# Patient Record
Sex: MALE | Race: OTHER | NOT HISPANIC OR LATINO | URBAN - METROPOLITAN AREA
[De-identification: names, ages, dates, MRNs, and addresses within clinical notes are randomized per-mention and may not be internally consistent; named-entity substitution may affect disease eponyms.]

---

## 2018-03-16 ENCOUNTER — EMERGENCY (EMERGENCY)
Facility: HOSPITAL | Age: 83
LOS: 1 days | Discharge: HOME | End: 2018-03-16
Attending: EMERGENCY MEDICINE | Admitting: EMERGENCY MEDICINE
Payer: MEDICARE

## 2018-03-16 VITALS
HEART RATE: 78 BPM | OXYGEN SATURATION: 96 % | RESPIRATION RATE: 20 BRPM | SYSTOLIC BLOOD PRESSURE: 131 MMHG | TEMPERATURE: 99 F | DIASTOLIC BLOOD PRESSURE: 60 MMHG

## 2018-03-16 VITALS
HEART RATE: 107 BPM | DIASTOLIC BLOOD PRESSURE: 67 MMHG | TEMPERATURE: 98 F | RESPIRATION RATE: 20 BRPM | OXYGEN SATURATION: 98 % | SYSTOLIC BLOOD PRESSURE: 137 MMHG

## 2018-03-16 LAB
ALBUMIN SERPL ELPH-MCNC: 3.6 G/DL — SIGNIFICANT CHANGE UP (ref 3.3–5)
ALP SERPL-CCNC: 80 U/L — SIGNIFICANT CHANGE UP (ref 40–120)
ALT FLD-CCNC: 32 U/L — SIGNIFICANT CHANGE UP (ref 4–41)
ANISOCYTOSIS BLD QL: SLIGHT — SIGNIFICANT CHANGE UP
APTT BLD: 30.8 SEC — SIGNIFICANT CHANGE UP (ref 27.5–37.4)
AST SERPL-CCNC: 39 U/L — SIGNIFICANT CHANGE UP (ref 4–40)
BASE EXCESS BLDV CALC-SCNC: 3.3 MMOL/L — SIGNIFICANT CHANGE UP
BASOPHILS # BLD AUTO: 0.03 K/UL — SIGNIFICANT CHANGE UP (ref 0–0.2)
BASOPHILS NFR BLD AUTO: 0.4 % — SIGNIFICANT CHANGE UP (ref 0–2)
BILIRUB SERPL-MCNC: 0.4 MG/DL — SIGNIFICANT CHANGE UP (ref 0.2–1.2)
BLOOD GAS VENOUS - CREATININE: 1.51 MG/DL — HIGH (ref 0.5–1.3)
BUN SERPL-MCNC: 27 MG/DL — HIGH (ref 7–23)
CALCIUM SERPL-MCNC: 9.1 MG/DL — SIGNIFICANT CHANGE UP (ref 8.4–10.5)
CHLORIDE BLDV-SCNC: 106 MMOL/L — SIGNIFICANT CHANGE UP (ref 96–108)
CHLORIDE SERPL-SCNC: 101 MMOL/L — SIGNIFICANT CHANGE UP (ref 98–107)
CO2 SERPL-SCNC: 24 MMOL/L — SIGNIFICANT CHANGE UP (ref 22–31)
CREAT SERPL-MCNC: 1.64 MG/DL — HIGH (ref 0.5–1.3)
DACRYOCYTES BLD QL SMEAR: SLIGHT — SIGNIFICANT CHANGE UP
EOSINOPHIL # BLD AUTO: 0.6 K/UL — HIGH (ref 0–0.5)
EOSINOPHIL NFR BLD AUTO: 8.8 % — HIGH (ref 0–6)
GAS PNL BLDV: 135 MMOL/L — LOW (ref 136–146)
GLUCOSE BLDV-MCNC: 108 — HIGH (ref 70–99)
GLUCOSE SERPL-MCNC: 104 MG/DL — HIGH (ref 70–99)
HCO3 BLDV-SCNC: 26 MMOL/L — SIGNIFICANT CHANGE UP (ref 20–27)
HCT VFR BLD CALC: 37 % — LOW (ref 39–50)
HCT VFR BLDV CALC: 34.1 % — LOW (ref 39–51)
HGB BLD-MCNC: 10.8 G/DL — LOW (ref 13–17)
HGB BLDV-MCNC: 11.1 G/DL — LOW (ref 13–17)
HYPOCHROMIA BLD QL: SLIGHT — SIGNIFICANT CHANGE UP
IMM GRANULOCYTES # BLD AUTO: 0.01 # — SIGNIFICANT CHANGE UP
IMM GRANULOCYTES NFR BLD AUTO: 0.1 % — SIGNIFICANT CHANGE UP (ref 0–1.5)
INR BLD: 0.98 — SIGNIFICANT CHANGE UP (ref 0.88–1.17)
LACTATE BLDV-MCNC: 1.4 MMOL/L — SIGNIFICANT CHANGE UP (ref 0.5–2)
LG PLATELETS BLD QL AUTO: SLIGHT — SIGNIFICANT CHANGE UP
LYMPHOCYTES # BLD AUTO: 2.06 K/UL — SIGNIFICANT CHANGE UP (ref 1–3.3)
LYMPHOCYTES # BLD AUTO: 30.1 % — SIGNIFICANT CHANGE UP (ref 13–44)
MANUAL SMEAR VERIFICATION: SIGNIFICANT CHANGE UP
MCHC RBC-ENTMCNC: 19 PG — LOW (ref 27–34)
MCHC RBC-ENTMCNC: 29.2 % — LOW (ref 32–36)
MCV RBC AUTO: 65.3 FL — LOW (ref 80–100)
MICROCYTES BLD QL: SIGNIFICANT CHANGE UP
MONOCYTES # BLD AUTO: 0.86 K/UL — SIGNIFICANT CHANGE UP (ref 0–0.9)
MONOCYTES NFR BLD AUTO: 12.6 % — SIGNIFICANT CHANGE UP (ref 2–14)
NEUTROPHILS # BLD AUTO: 3.29 K/UL — SIGNIFICANT CHANGE UP (ref 1.8–7.4)
NEUTROPHILS NFR BLD AUTO: 48 % — SIGNIFICANT CHANGE UP (ref 43–77)
NRBC # FLD: 0 — SIGNIFICANT CHANGE UP
OVALOCYTES BLD QL SMEAR: SIGNIFICANT CHANGE UP
PCO2 BLDV: 55 MMHG — HIGH (ref 41–51)
PH BLDV: 7.34 PH — SIGNIFICANT CHANGE UP (ref 7.32–7.43)
PLATELET # BLD AUTO: 212 K/UL — SIGNIFICANT CHANGE UP (ref 150–400)
PLATELET COUNT - ESTIMATE: NORMAL — SIGNIFICANT CHANGE UP
PMV BLD: 9.3 FL — SIGNIFICANT CHANGE UP (ref 7–13)
PO2 BLDV: 50 MMHG — HIGH (ref 35–40)
POIKILOCYTOSIS BLD QL AUTO: SLIGHT — SIGNIFICANT CHANGE UP
POTASSIUM BLDV-SCNC: 4.1 MMOL/L — SIGNIFICANT CHANGE UP (ref 3.4–4.5)
POTASSIUM SERPL-MCNC: 4.6 MMOL/L — SIGNIFICANT CHANGE UP (ref 3.5–5.3)
POTASSIUM SERPL-SCNC: 4.6 MMOL/L — SIGNIFICANT CHANGE UP (ref 3.5–5.3)
PROT SERPL-MCNC: 6.5 G/DL — SIGNIFICANT CHANGE UP (ref 6–8.3)
PROTHROM AB SERPL-ACNC: 10.9 SEC — SIGNIFICANT CHANGE UP (ref 9.8–13.1)
RBC # BLD: 5.67 M/UL — SIGNIFICANT CHANGE UP (ref 4.2–5.8)
RBC # FLD: 19.6 % — HIGH (ref 10.3–14.5)
SAO2 % BLDV: 80.4 % — SIGNIFICANT CHANGE UP (ref 60–85)
SCHISTOCYTES BLD QL AUTO: SLIGHT — SIGNIFICANT CHANGE UP
SODIUM SERPL-SCNC: 139 MMOL/L — SIGNIFICANT CHANGE UP (ref 135–145)
WBC # BLD: 6.85 K/UL — SIGNIFICANT CHANGE UP (ref 3.8–10.5)
WBC # FLD AUTO: 6.85 K/UL — SIGNIFICANT CHANGE UP (ref 3.8–10.5)

## 2018-03-16 PROCEDURE — 71046 X-RAY EXAM CHEST 2 VIEWS: CPT | Mod: 26

## 2018-03-16 PROCEDURE — 99285 EMERGENCY DEPT VISIT HI MDM: CPT | Mod: GC

## 2018-03-16 NOTE — ED ADULT NURSE NOTE - OBJECTIVE STATEMENT
Facilitator RN: pt AO x3, ambulatory with cane on his baseline, c/o SOB with subjective fevers and productive cough/greenish yellow x 2 days. pt reports having pneumonia on last December and pt feels symptoms are similar. Also PMH of stroke on December 2016 so right side body is weak on his baseline. O2 sat 96% room air. 99.3F rectal. Denies chest pain, dizziness or n/v at this time. Pending evaluation by provider. blood obtained and sent to LAB. IV inserted, left hand 20G. Will continue to monitor. Report given primary RN.

## 2018-03-16 NOTE — ED PROVIDER NOTE - OBJECTIVE STATEMENT
81 y/o M w/ Hx DM, HTN, HLD pw cough.  2 days productive cough, improved w/ mucinex associated w/ generalized weakness and mild SOB.  Had PNA 2 months PTA, notes similar symptoms.  ALso notes mild increase to LE edema bilateral (on lasix).  Denies CP, Ap, n/v, d/c, known sick contacts, or recent travel.  + subjective fever.

## 2018-03-16 NOTE — ED PROVIDER NOTE - MEDICAL DECISION MAKING DETAILS
pt w/ s/s consistent w/ URI, will eval PNA, does not meet sepsis criteria.  mild LE edema on lasix - no signs acute fluid overload.  Will check labs/xray and reeval.

## 2018-03-16 NOTE — ED PROVIDER NOTE - ATTENDING CONTRIBUTION TO CARE
DR. LOMBARDO, ATTENDING MD-  I performed a face to face bedside interview with patient regarding history of present illness, review of symptoms and past medical history. I completed an independent physical exam.  I have discussed patient's plan of care with the resident.   Documentation as above in the note.    83 y/o male h/o dm, htn, hld with c/o subj fever, prod cough at home x2 days.  States recent admission to osh in [place] in December for pna.  Sx feel similar but more mild in severity.  Denies ha, neck stiffness, cp, sob, abd pain, n/v/d, dysuria, rash.  Afebrile, vs wnl, nad, mmm, ctabil, s1s2 rrr no m/r/g, abd soft non ttp no r/g, no cva tenderness b/l, leg swelling b/l chronic per pt, no rash.  Eval for pna vs uri.  Obtain cbc, bmp, cxr, pt is well appearing, antic dc home with pcp f/u.

## 2018-03-16 NOTE — ED PROVIDER NOTE - CARE PLAN
Principal Discharge DX:	Upper respiratory tract infection, unspecified type  Secondary Diagnosis:	Kidney disease

## 2018-03-16 NOTE — ED ADULT TRIAGE NOTE - CHIEF COMPLAINT QUOTE
pt presents c/o sob, subjective fevers and productive cough x 2 days with generalized weakness. pt reports having pna in december and reports symptoms are similar. family states pt has decreased PO intake. denies any additional symptoms. FS: 94 in triage PMHX: htn, pna, dm, gout, stroke with right sided residual weakness

## 2018-03-17 LAB
APPEARANCE UR: CLEAR — SIGNIFICANT CHANGE UP
BILIRUB UR-MCNC: NEGATIVE — SIGNIFICANT CHANGE UP
BLOOD UR QL VISUAL: NEGATIVE — SIGNIFICANT CHANGE UP
COLOR SPEC: YELLOW — SIGNIFICANT CHANGE UP
GLUCOSE UR-MCNC: NEGATIVE — SIGNIFICANT CHANGE UP
HYALINE CASTS # UR AUTO: SIGNIFICANT CHANGE UP (ref 0–?)
KETONES UR-MCNC: NEGATIVE — SIGNIFICANT CHANGE UP
LEUKOCYTE ESTERASE UR-ACNC: NEGATIVE — SIGNIFICANT CHANGE UP
MUCOUS THREADS # UR AUTO: SIGNIFICANT CHANGE UP
NITRITE UR-MCNC: NEGATIVE — SIGNIFICANT CHANGE UP
PH UR: 5.5 — SIGNIFICANT CHANGE UP (ref 4.6–8)
PROT UR-MCNC: 100 MG/DL — SIGNIFICANT CHANGE UP
RBC CASTS # UR COMP ASSIST: SIGNIFICANT CHANGE UP (ref 0–?)
SP GR SPEC: 1.02 — SIGNIFICANT CHANGE UP (ref 1–1.04)
SPECIMEN SOURCE: SIGNIFICANT CHANGE UP
SPECIMEN SOURCE: SIGNIFICANT CHANGE UP
SQUAMOUS # UR AUTO: SIGNIFICANT CHANGE UP
UROBILINOGEN FLD QL: NORMAL MG/DL — SIGNIFICANT CHANGE UP
WBC UR QL: SIGNIFICANT CHANGE UP (ref 0–?)

## 2018-03-18 LAB
BACTERIA UR CULT: SIGNIFICANT CHANGE UP
SPECIMEN SOURCE: SIGNIFICANT CHANGE UP

## 2018-03-21 LAB
BACTERIA BLD CULT: SIGNIFICANT CHANGE UP
BACTERIA BLD CULT: SIGNIFICANT CHANGE UP

## 2018-12-22 ENCOUNTER — EMERGENCY (EMERGENCY)
Facility: HOSPITAL | Age: 83
LOS: 1 days | Discharge: HOME | End: 2018-12-22
Attending: EMERGENCY MEDICINE | Admitting: EMERGENCY MEDICINE
Payer: MEDICARE

## 2018-12-22 VITALS
OXYGEN SATURATION: 97 % | SYSTOLIC BLOOD PRESSURE: 135 MMHG | RESPIRATION RATE: 15 BRPM | DIASTOLIC BLOOD PRESSURE: 82 MMHG | HEART RATE: 85 BPM | TEMPERATURE: 98 F

## 2018-12-22 PROBLEM — E11.9 TYPE 2 DIABETES MELLITUS WITHOUT COMPLICATIONS: Chronic | Status: ACTIVE | Noted: 2018-03-16

## 2018-12-22 PROBLEM — E78.5 HYPERLIPIDEMIA, UNSPECIFIED: Chronic | Status: ACTIVE | Noted: 2018-03-16

## 2018-12-22 PROBLEM — M10.9 GOUT, UNSPECIFIED: Chronic | Status: ACTIVE | Noted: 2018-03-16

## 2018-12-22 PROBLEM — I10 ESSENTIAL (PRIMARY) HYPERTENSION: Chronic | Status: ACTIVE | Noted: 2018-03-16

## 2018-12-22 LAB
ANISOCYTOSIS BLD QL: SLIGHT — SIGNIFICANT CHANGE UP
BASE EXCESS BLDV CALC-SCNC: 5.1 MMOL/L — SIGNIFICANT CHANGE UP
BASOPHILS # BLD AUTO: 0.01 K/UL — SIGNIFICANT CHANGE UP (ref 0–0.2)
BASOPHILS NFR BLD AUTO: 0.1 % — SIGNIFICANT CHANGE UP (ref 0–2)
BLOOD GAS VENOUS - CREATININE: 1.39 MG/DL — HIGH (ref 0.5–1.3)
BURR CELLS BLD QL SMEAR: SLIGHT — SIGNIFICANT CHANGE UP
CHLORIDE BLDV-SCNC: 105 MMOL/L — SIGNIFICANT CHANGE UP (ref 96–108)
DACRYOCYTES BLD QL SMEAR: SLIGHT — SIGNIFICANT CHANGE UP
EOSINOPHIL # BLD AUTO: 0.51 K/UL — HIGH (ref 0–0.5)
EOSINOPHIL NFR BLD AUTO: 5.2 % — SIGNIFICANT CHANGE UP (ref 0–6)
GAS PNL BLDV: 131 MMOL/L — LOW (ref 136–146)
GLUCOSE BLDV-MCNC: 96 — SIGNIFICANT CHANGE UP (ref 70–99)
HCO3 BLDV-SCNC: 28 MMOL/L — HIGH (ref 20–27)
HCT VFR BLD CALC: 43.2 % — SIGNIFICANT CHANGE UP (ref 39–50)
HCT VFR BLDV CALC: 41.7 % — SIGNIFICANT CHANGE UP (ref 39–51)
HGB BLD-MCNC: 13.3 G/DL — SIGNIFICANT CHANGE UP (ref 13–17)
HGB BLDV-MCNC: 13.6 G/DL — SIGNIFICANT CHANGE UP (ref 13–17)
HYPOCHROMIA BLD QL: SLIGHT — SIGNIFICANT CHANGE UP
IMM GRANULOCYTES # BLD AUTO: 0.11 # — SIGNIFICANT CHANGE UP
IMM GRANULOCYTES NFR BLD AUTO: 1.1 % — SIGNIFICANT CHANGE UP (ref 0–1.5)
LACTATE BLDV-MCNC: 2 MMOL/L — SIGNIFICANT CHANGE UP (ref 0.5–2)
LYMPHOCYTES # BLD AUTO: 1.93 K/UL — SIGNIFICANT CHANGE UP (ref 1–3.3)
LYMPHOCYTES # BLD AUTO: 19.5 % — SIGNIFICANT CHANGE UP (ref 13–44)
MCHC RBC-ENTMCNC: 19.9 PG — LOW (ref 27–34)
MCHC RBC-ENTMCNC: 30.8 % — LOW (ref 32–36)
MCV RBC AUTO: 64.6 FL — LOW (ref 80–100)
MICROCYTES BLD QL: SIGNIFICANT CHANGE UP
MONOCYTES # BLD AUTO: 0.79 K/UL — SIGNIFICANT CHANGE UP (ref 0–0.9)
MONOCYTES NFR BLD AUTO: 8 % — SIGNIFICANT CHANGE UP (ref 2–14)
NEUTROPHILS # BLD AUTO: 6.53 K/UL — SIGNIFICANT CHANGE UP (ref 1.8–7.4)
NEUTROPHILS NFR BLD AUTO: 66.1 % — SIGNIFICANT CHANGE UP (ref 43–77)
NRBC # FLD: 0 — SIGNIFICANT CHANGE UP
OVALOCYTES BLD QL SMEAR: SLIGHT — SIGNIFICANT CHANGE UP
PCO2 BLDV: 52 MMHG — HIGH (ref 41–51)
PH BLDV: 7.38 PH — SIGNIFICANT CHANGE UP (ref 7.32–7.43)
PLATELET # BLD AUTO: 115 K/UL — LOW (ref 150–400)
PLATELET COUNT - ESTIMATE: SIGNIFICANT CHANGE UP
PMV BLD: SIGNIFICANT CHANGE UP FL (ref 7–13)
PO2 BLDV: 41 MMHG — HIGH (ref 35–40)
POIKILOCYTOSIS BLD QL AUTO: SIGNIFICANT CHANGE UP
POLYCHROMASIA BLD QL SMEAR: SLIGHT — SIGNIFICANT CHANGE UP
POTASSIUM BLDV-SCNC: 4.2 MMOL/L — SIGNIFICANT CHANGE UP (ref 3.4–4.5)
RBC # BLD: 6.69 M/UL — HIGH (ref 4.2–5.8)
RBC # FLD: 19.8 % — HIGH (ref 10.3–14.5)
SAO2 % BLDV: 69 % — SIGNIFICANT CHANGE UP (ref 60–85)
TARGETS BLD QL SMEAR: SLIGHT — SIGNIFICANT CHANGE UP
WBC # BLD: 9.88 K/UL — SIGNIFICANT CHANGE UP (ref 3.8–10.5)
WBC # FLD AUTO: 9.88 K/UL — SIGNIFICANT CHANGE UP (ref 3.8–10.5)

## 2018-12-22 PROCEDURE — 99285 EMERGENCY DEPT VISIT HI MDM: CPT | Mod: GC

## 2018-12-22 RX ORDER — DEXAMETHASONE 0.5 MG/5ML
10 ELIXIR ORAL ONCE
Qty: 0 | Refills: 0 | Status: COMPLETED | OUTPATIENT
Start: 2018-12-22 | End: 2018-12-22

## 2018-12-22 RX ORDER — SODIUM CHLORIDE 9 MG/ML
500 INJECTION INTRAMUSCULAR; INTRAVENOUS; SUBCUTANEOUS ONCE
Qty: 0 | Refills: 0 | Status: COMPLETED | OUTPATIENT
Start: 2018-12-22 | End: 2018-12-22

## 2018-12-22 RX ORDER — METRONIDAZOLE 500 MG
500 TABLET ORAL ONCE
Qty: 0 | Refills: 0 | Status: COMPLETED | OUTPATIENT
Start: 2018-12-22 | End: 2018-12-22

## 2018-12-22 RX ORDER — DEXAMETHASONE 0.5 MG/5ML
10 ELIXIR ORAL ONCE
Qty: 0 | Refills: 0 | Status: DISCONTINUED | OUTPATIENT
Start: 2018-12-22 | End: 2018-12-22

## 2018-12-22 RX ORDER — CEFTRIAXONE 500 MG/1
2 INJECTION, POWDER, FOR SOLUTION INTRAMUSCULAR; INTRAVENOUS ONCE
Qty: 0 | Refills: 0 | Status: COMPLETED | OUTPATIENT
Start: 2018-12-22 | End: 2018-12-22

## 2018-12-22 RX ORDER — CEFTRIAXONE 500 MG/1
1 INJECTION, POWDER, FOR SOLUTION INTRAMUSCULAR; INTRAVENOUS ONCE
Qty: 0 | Refills: 0 | Status: DISCONTINUED | OUTPATIENT
Start: 2018-12-22 | End: 2018-12-22

## 2018-12-22 RX ADMIN — Medication 102 MILLIGRAM(S): at 23:58

## 2018-12-22 RX ADMIN — CEFTRIAXONE 100 GRAM(S): 500 INJECTION, POWDER, FOR SOLUTION INTRAMUSCULAR; INTRAVENOUS at 23:30

## 2018-12-22 RX ADMIN — SODIUM CHLORIDE 1000 MILLILITER(S): 9 INJECTION INTRAMUSCULAR; INTRAVENOUS; SUBCUTANEOUS at 23:40

## 2018-12-22 NOTE — ED PROVIDER NOTE - NSFOLLOWUPINSTRUCTIONS_ED_ALL_ED_FT
1. Return to ED for worsening, progressive or any other concerning symptoms such as trouble breathing, severe pain, trouble walking, inability to eat/drink due to vomiting, or confusion  2. Follow up with your primary care doctor in 2-3 days   3. Please take your antibiotic twice daily for 7 days even if symptoms go away before then  4. Drink enough fluids so that you are urinating every few hours with pale yellow urine 1. Return to ED for worsening, progressive or any other concerning symptoms such as trouble breathing, severe pain, trouble walking, inability to eat/drink due to vomiting, or confusion  2. Follow up with your primary care doctor in 2-3 days   3. Please take your antibiotic twice daily for 7 days even if symptoms go away before then  4. Drink enough fluids so that you are urinating every few hours with pale yellow urine  5. If your symptoms continue please make an appointment with the ENT clinic by calling the number in this packet

## 2018-12-22 NOTE — ED PROVIDER NOTE - OBJECTIVE STATEMENT
82 y/o M w/ PMHx of DM and HTN, recent dental procedure x2wks ago put on course of [id number] of steroids recently due to swelling of the rt supraclavicular region, p/w pain w/ swallowing and hoarseness of voice. Pt also endorses blisters in the mouth and fevers at home. Of note pt saw his PMD x3days ago and put on [id number] w/o any relief. Pt reports the pain in the rt mandibular region where the extraction was has been increasing since the procedure. Denies any difficulty swallowing; just having pain w/ swallowing. 82 y/o M w/ PMHx of DM and HTN, recent dental procedure x2wks ago put on course of c68jiny of steroids recently due to swelling of the rt supraclavicular region, p/w pain w/ swallowing and hoarseness of voice. Pt also endorses blisters in the mouth and fevers at home. Of note pt saw his PMD x3days ago and put on Fluconazole [id number] w/o any relief. Pt reports the pain in the rt mandibular region where the extraction was. which has been increasing since the procedure. Denies any difficulty swallowing; just having pain w/ swallowing. 84 y/o M w/ PMHx of DM and HTN, recent dental procedure x2wks ago put on course of x34less of steroids recently due to swelling of the rt supraclavicular region, p/w pain w/ swallowing and hoarseness of voice. Pt also endorses blisters in the mouth and fevers at home. Of note pt saw his PMD x3days ago and put on Fluconazole [id number] w/o any relief. Pt reports the pain in the rt mandibular region where the extraction was which has been increasing since the procedure. Denies any difficulty swallowing; having pain w/ swallowing. Hoarseness of voice and pain with swallowing over the last 3-4 days.

## 2018-12-22 NOTE — ED PROVIDER NOTE - PROGRESS NOTE DETAILS
Cheryl: Pt signed out to DR Garcia and moved to the red area for a higher level of care. Spoke with ENT, does not have scope at this time, will see pt as soon as possible  Carloina Vinson, PGY-2 EM

## 2018-12-22 NOTE — ED PROVIDER NOTE - CARE PROVIDER_API CALL
Ned Salas), Family Medicine  [address] [place] [address]  Phone: [phone]  Fax: [phone]

## 2018-12-22 NOTE — ED PROVIDER NOTE - MEDICAL DECISION MAKING DETAILS
84 yo M here with pain with swallowing, hoarse voice. Pt was seen in intake and upon being evaluated, appears to have muffled voice, tonsillar hypertrophy and significant neck swelling, also s/p dental extraction 2 weeks ago- pt requires higher level of care, currently protecting airway however there is a concerns for maintaining given clinical picture. Will call ENT, CT IV contrast, steroids, empiric abx, and admission. Potential for RPA vs septic thrombophlebitis vs malignancy

## 2018-12-22 NOTE — ED ADULT NURSE NOTE - OBJECTIVE STATEMENT
Pt received A&Ox4 to ED Intake 13 with c/o sore throat & mouth sores, chills x 3d sp extraction of teeth. Pt hard to understand. Swollen tonsils/lymph nodes noted. Pt denies SOB - no drooling noted. MD concerned for airway compromise; transferred to Main ED Tr A. Labs sent per MD orders. Family at bedside.

## 2018-12-22 NOTE — ED PROVIDER NOTE - NSFOLLOWUPCLINICS_GEN_ALL_ED_FT
[facility] ENT  ENT  [address], [address]  [place] [address]  Phone: [phone]  Fax:   Follow Up Time:

## 2018-12-22 NOTE — ED PROVIDER NOTE - PHYSICAL EXAMINATION
HEENT: b/l tonsillar hypertrophy, muffled voice, neck/supraclavicular swelling, poor dentition, extraction site on the right mandibular region without abscess, small palatal lesions without blistering

## 2018-12-22 NOTE — ED PROVIDER NOTE - ATTENDING CONTRIBUTION TO CARE
Cheryl: 82 yo male with a h/o DM, HTN, DLD c/o 10 days of progressively worsening sore throat. subjective fevers and chills. Of note pt also had dental work done 2 weeks ago. Pt also endorsing left sided neck swelling x 2 weeks. No chest pain or SOB. Exam: GENERAL: well appearing, NAD, HEENT: MMM, b/l tonsillar hypertrophy, uvula deviating to the left, pt has muffled voice and significant b/l neck/supraclavicular edema, worse on the right, + palpable supraclavicular lymphadenopathy on the right.  +palatal petechiae, genrally poor dentition s/p multiple extractions.  CARDIO: +S1/S2, no murmurs, rubs or gallops, LUNGS: CTA B/L, no wheezing, rales or rhonchi, ABD: soft, nontender, BSx4 quadrants, no guarding or rigidity. NEURO: AxOx3, SKIN: no rashes or lesions, well perfused A/P- 82 yo male with muffled voice, sore throat and muffled concerning for deep space infection. will obtain cbc, cmp, blood cultures, VBG, CT soft tissue neck, give abx to cover Lemierre's and consult ENT for emergent scope.

## 2018-12-22 NOTE — ED ADULT TRIAGE NOTE - CHIEF COMPLAINT QUOTE
pt accompanied by son stating "there is an infection in his mouth," endorses sores on tounge and lip with sore throat x 3 days. also reports subjective fevers and chills

## 2018-12-23 VITALS
HEART RATE: 75 BPM | TEMPERATURE: 98 F | RESPIRATION RATE: 16 BRPM | OXYGEN SATURATION: 100 % | DIASTOLIC BLOOD PRESSURE: 80 MMHG | SYSTOLIC BLOOD PRESSURE: 141 MMHG

## 2018-12-23 LAB
ALBUMIN SERPL ELPH-MCNC: 3.1 G/DL — LOW (ref 3.3–5)
ALP SERPL-CCNC: 78 U/L — SIGNIFICANT CHANGE UP (ref 40–120)
ALT FLD-CCNC: 67 U/L — HIGH (ref 4–41)
AST SERPL-CCNC: 61 U/L — HIGH (ref 4–40)
BILIRUB SERPL-MCNC: 0.5 MG/DL — SIGNIFICANT CHANGE UP (ref 0.2–1.2)
BUN SERPL-MCNC: 23 MG/DL — SIGNIFICANT CHANGE UP (ref 7–23)
CALCIUM SERPL-MCNC: 9.6 MG/DL — SIGNIFICANT CHANGE UP (ref 8.4–10.5)
CHLORIDE SERPL-SCNC: 99 MMOL/L — SIGNIFICANT CHANGE UP (ref 98–107)
CO2 SERPL-SCNC: 25 MMOL/L — SIGNIFICANT CHANGE UP (ref 22–31)
CREAT SERPL-MCNC: 1.38 MG/DL — HIGH (ref 0.5–1.3)
GLUCOSE SERPL-MCNC: 91 MG/DL — SIGNIFICANT CHANGE UP (ref 70–99)
POTASSIUM SERPL-MCNC: 5.5 MMOL/L — HIGH (ref 3.5–5.3)
POTASSIUM SERPL-SCNC: 5.5 MMOL/L — HIGH (ref 3.5–5.3)
PROT SERPL-MCNC: 6 G/DL — SIGNIFICANT CHANGE UP (ref 6–8.3)
SODIUM SERPL-SCNC: 136 MMOL/L — SIGNIFICANT CHANGE UP (ref 135–145)
SPECIMEN SOURCE: SIGNIFICANT CHANGE UP
SPECIMEN SOURCE: SIGNIFICANT CHANGE UP

## 2018-12-23 PROCEDURE — 70491 CT SOFT TISSUE NECK W/DYE: CPT | Mod: 26

## 2018-12-23 RX ADMIN — Medication 100 MILLIGRAM(S): at 00:30

## 2018-12-23 NOTE — CONSULT NOTE ADULT - SUBJECTIVE AND OBJECTIVE BOX
HPI  83M h/o DM and HTN, recent dental procedure x2wks ago put on course of [id number] of steroids recently due to swelling of the R supraclavicular region now improved p/w c/o odynophagia and hoarseness of voice. Patient reports oral ulcers for the past week for which he was started on fluconazole without improvement. He denies any dysphagia and the odynophagia seems mostly related to the oral pain. Denies SOB. Says he has had hoarseness for a years but his voice got slightly worse since the dental procedure 10 days ago. In the ED has been afebrile, WBC 9, CT neck wnl.    Past Medical and Surgical History:  Gout  HLD (hyperlipidemia)  HTN (hypertension)  DM (diabetes mellitus)    Medications  MEDICATIONS  (STANDING):    MEDICATIONS  (PRN):    Allergies  No Known Allergies    Review of Systems  General: Negative except for HPI  Neurological: Negative.  Opthalmologic: Negative.  ENT: Negative except for HPI.  Respiratory: Negative.    Cardiovascular: Negative.    Gastrointestinal: Negative.    Genitourinary: Negative.    Musculoskeletal: Negative.    Dermatologic: Negative.    Endocrinology: Negative.    Hematological: Negative.    Psychiatric: Negative.      Vital signs past 24h  T(F): 98.5 (23 Dec 2018 00:40), Max: 98.5 (23 Dec 2018 00:40)  HR: 75 (23 Dec 2018 00:40) (75 - 85)  BP: 141/80 (23 Dec 2018 00:40) (135/82 - 141/80)  BP(mean): --  RR: 16 (23 Dec 2018 00:40) (15 - 16)  SpO2: 100% (23 Dec 2018 00:40) (97% - 100%)    Physical Exam  Constitutional: NAD, alert, awake  HENT:         Head: Normocephalic, atraumatic       Face: Symmetric, no lesion or mass       Ears:            Right: External ear normal, canal normal and tympanic membrane normal, no middle ear effusion, no mastoid tenderness fullness or erythema          Left:  External ear normal, canal normal and tympanic membrane normal, no middle ear effusion, no mastoid tenderness fullness or erythema       Nose: No external deformity, clear anteriorly       Oral cavity and oropharynx: tongue midline with several small ulcers, floor of mouth flat soft and non tender, tonsils 1+ without erythema or exudate, no soft palate edema, uvula midline, posterior OP clear       Voice: Slightly hoarse, strong        Neck: Soft, flat, trachea midline, no TTP, R>L supraclavicular fullness for which patient has been followed and which has been improving  Eyes: EOMI  Pulmonary/Chest: Breathing comfortably on room air, no stridor or stertor  Abdominal: Non-distended  Genitourinary: Not indicated  Neurological: No focal neuro deficit, CN 2-12 grossly intact  Skin: No obvious lesion or rash  Musculoskeletal: No deformities noted, full ROM in all major joints    Psychiatric: Alert, appropriate responses and attention span     Flexible Laryngoscopy  NC: no turbinate hypertrophy, no copious secretions  OC/OP: palate, uvula, tonsils, vallecula, BOT, posterior pharyngeal wall wnl  Supraglottis: epiglottis, AE folds, arytenoids, false vocal cords wnl  Hypopharynx: pyriform sinuses, posterior pharyngeal wall, post-cricoid fullness suggestive of LPR  Glottis: true vocal cords with normal mobility, atrophic changes related to age but no lesion, edema or erythema  Subglottis: appears patent    Labs                        13.3   9.88  )-----------( 115      ( 22 Dec 2018 23:04 )             43.2     12-22    136  |  99  |  23  ----------------------------<  91  5.5<H>   |  25  |  1.38<H>    Ca    9.6      22 Dec 2018 23:04    TPro  6.0  /  Alb  3.1<L>  /  TBili  0.5  /  DBili  x   /  AST  61<H>  /  ALT  67<H>  /  AlkPhos  78  12-22    Imaging  EXAM:  CT NECK SOFT TISSUE IC    PROCEDURE DATE:  Dec 23 2018   FINDINGS:  Evaluation is mildly degraded by motion.  There is no significant   enlargement of the nasopharyngeal or oropharyngeal lymphoid tissue. There   is no infiltration of the parapharyngeal fat. There is no peritonsillar   or retropharyngeal abscess/collection. The epiglottis and aryepiglottic   folds are not thickened. The piriform sinuses are distended with air.   Motion limits evaluation of the larynx. The subglottic airway is patent.    The parotid glands and submandibular glands are unremarkable. The thyroid   gland is unremarkable in appearance.    There are no perimandibular soft tissue inflammatory changes to suggest   an odontogenic infection.    There is no significant cervical lymphadenopathy.    An aberrant right subclavian artery is noted. There is normal variant   retropharyngeal deviation of the common carotid and internal carotid   arteries. There is atherosclerotic calcification at the common carotid   artery bifurcations.    The visualized portions of the brain demonstrate mild cerebral volume   loss. The intraorbital soft tissues are unremarkable apart from evidence   of bilateral lens surgery.    The lung apices are clear apart from dependent atelectasis.    The paranasal sinuses and tympanic/mastoid cavities are clear apart from   minimal bilateral ethmoid mucosal thickening.    There are multilevel degenerative changes of the spine.    IMPRESSION:  No evidence of abscess or inflammatory change within the soft tissues of   the neck.    Assessment and Plan  83M with complains of oral pain and odynophagia 2/2 oral ulcers, scope suggestive of laryngopharyngeal reflux otherwise wnl and patient looks overall non-toxic.  -H2 blockers vs ppi  -symptomatic management for the oral ulcers (magic mouthwash/viscous lidocaine, sucralfate)  -encourage oral intake   -may follow-up in the ENT clinic as an outpatient if hoarseness does not improve, call  to schedule

## 2018-12-27 LAB
BACTERIA BLD CULT: SIGNIFICANT CHANGE UP
BACTERIA BLD CULT: SIGNIFICANT CHANGE UP

## 2019-01-01 ENCOUNTER — OUTPATIENT (OUTPATIENT)
Dept: OUTPATIENT SERVICES | Facility: HOSPITAL | Age: 84
LOS: 1 days | End: 2019-01-01
Payer: MEDICARE

## 2019-01-01 PROCEDURE — G9001: CPT

## 2019-01-06 ENCOUNTER — INPATIENT (INPATIENT)
Facility: HOSPITAL | Age: 84
LOS: 3 days | Discharge: LEFT BEFORE DISCHARGE | End: 2019-01-10
Attending: INTERNAL MEDICINE | Admitting: INTERNAL MEDICINE
Payer: MEDICARE

## 2019-01-06 VITALS
TEMPERATURE: 103 F | HEART RATE: 120 BPM | RESPIRATION RATE: 18 BRPM | OXYGEN SATURATION: 98 % | SYSTOLIC BLOOD PRESSURE: 143 MMHG | DIASTOLIC BLOOD PRESSURE: 67 MMHG

## 2019-01-06 DIAGNOSIS — R06.02 SHORTNESS OF BREATH: ICD-10-CM

## 2019-01-06 DIAGNOSIS — G47.33 OBSTRUCTIVE SLEEP APNEA (ADULT) (PEDIATRIC): ICD-10-CM

## 2019-01-06 DIAGNOSIS — A41.9 SEPSIS, UNSPECIFIED ORGANISM: ICD-10-CM

## 2019-01-06 DIAGNOSIS — N18.9 CHRONIC KIDNEY DISEASE, UNSPECIFIED: ICD-10-CM

## 2019-01-06 DIAGNOSIS — R74.0 NONSPECIFIC ELEVATION OF LEVELS OF TRANSAMINASE AND LACTIC ACID DEHYDROGENASE [LDH]: ICD-10-CM

## 2019-01-06 DIAGNOSIS — D50.9 IRON DEFICIENCY ANEMIA, UNSPECIFIED: ICD-10-CM

## 2019-01-06 DIAGNOSIS — Z29.9 ENCOUNTER FOR PROPHYLACTIC MEASURES, UNSPECIFIED: ICD-10-CM

## 2019-01-06 LAB
ALBUMIN SERPL ELPH-MCNC: 3.7 G/DL — SIGNIFICANT CHANGE UP (ref 3.3–5)
ALP SERPL-CCNC: 89 U/L — SIGNIFICANT CHANGE UP (ref 40–120)
ALT FLD-CCNC: 54 U/L — HIGH (ref 4–41)
ANISOCYTOSIS BLD QL: SIGNIFICANT CHANGE UP
APPEARANCE UR: CLEAR — SIGNIFICANT CHANGE UP
AST SERPL-CCNC: 67 U/L — HIGH (ref 4–40)
B PERT DNA SPEC QL NAA+PROBE: NOT DETECTED — SIGNIFICANT CHANGE UP
BACTERIA # UR AUTO: NEGATIVE — SIGNIFICANT CHANGE UP
BASE EXCESS BLDV CALC-SCNC: 0.1 MMOL/L — SIGNIFICANT CHANGE UP
BASE EXCESS BLDV CALC-SCNC: 0.8 MMOL/L — SIGNIFICANT CHANGE UP
BASOPHILS # BLD AUTO: 0.02 K/UL — SIGNIFICANT CHANGE UP (ref 0–0.2)
BASOPHILS NFR BLD AUTO: 0.2 % — SIGNIFICANT CHANGE UP (ref 0–2)
BASOPHILS NFR SPEC: 0 % — SIGNIFICANT CHANGE UP (ref 0–2)
BILIRUB SERPL-MCNC: 0.3 MG/DL — SIGNIFICANT CHANGE UP (ref 0.2–1.2)
BILIRUB UR-MCNC: NEGATIVE — SIGNIFICANT CHANGE UP
BLASTS # FLD: 0 % — SIGNIFICANT CHANGE UP (ref 0–0)
BLOOD GAS VENOUS - CREATININE: 1.35 MG/DL — HIGH (ref 0.5–1.3)
BLOOD GAS VENOUS - CREATININE: 1.37 MG/DL — HIGH (ref 0.5–1.3)
BLOOD UR QL VISUAL: NEGATIVE — SIGNIFICANT CHANGE UP
BUN SERPL-MCNC: 15 MG/DL — SIGNIFICANT CHANGE UP (ref 7–23)
C PNEUM DNA SPEC QL NAA+PROBE: NOT DETECTED — SIGNIFICANT CHANGE UP
CALCIUM SERPL-MCNC: 10.3 MG/DL — SIGNIFICANT CHANGE UP (ref 8.4–10.5)
CHLORIDE BLDV-SCNC: 107 MMOL/L — SIGNIFICANT CHANGE UP (ref 96–108)
CHLORIDE BLDV-SCNC: 107 MMOL/L — SIGNIFICANT CHANGE UP (ref 96–108)
CHLORIDE SERPL-SCNC: 102 MMOL/L — SIGNIFICANT CHANGE UP (ref 98–107)
CO2 SERPL-SCNC: 22 MMOL/L — SIGNIFICANT CHANGE UP (ref 22–31)
COLOR SPEC: YELLOW — SIGNIFICANT CHANGE UP
CREAT SERPL-MCNC: 1.39 MG/DL — HIGH (ref 0.5–1.3)
DACRYOCYTES BLD QL SMEAR: SLIGHT — SIGNIFICANT CHANGE UP
ELLIPTOCYTES BLD QL SMEAR: SIGNIFICANT CHANGE UP
EOSINOPHIL # BLD AUTO: 0.17 K/UL — SIGNIFICANT CHANGE UP (ref 0–0.5)
EOSINOPHIL NFR BLD AUTO: 1.9 % — SIGNIFICANT CHANGE UP (ref 0–6)
EOSINOPHIL NFR FLD: 0.9 % — SIGNIFICANT CHANGE UP (ref 0–6)
FLUAV H1 2009 PAND RNA SPEC QL NAA+PROBE: NOT DETECTED — SIGNIFICANT CHANGE UP
FLUAV H1 RNA SPEC QL NAA+PROBE: NOT DETECTED — SIGNIFICANT CHANGE UP
FLUAV H3 RNA SPEC QL NAA+PROBE: NOT DETECTED — SIGNIFICANT CHANGE UP
FLUAV SUBTYP SPEC NAA+PROBE: NOT DETECTED — SIGNIFICANT CHANGE UP
FLUBV RNA SPEC QL NAA+PROBE: NOT DETECTED — SIGNIFICANT CHANGE UP
GAS PNL BLDV: 134 MMOL/L — LOW (ref 136–146)
GAS PNL BLDV: 135 MMOL/L — LOW (ref 136–146)
GIANT PLATELETS BLD QL SMEAR: PRESENT — SIGNIFICANT CHANGE UP
GLUCOSE BLDC GLUCOMTR-MCNC: 90 MG/DL — SIGNIFICANT CHANGE UP (ref 70–99)
GLUCOSE BLDV-MCNC: 227 — HIGH (ref 70–99)
GLUCOSE BLDV-MCNC: 237 — HIGH (ref 70–99)
GLUCOSE SERPL-MCNC: 221 MG/DL — HIGH (ref 70–99)
GLUCOSE UR-MCNC: NEGATIVE — SIGNIFICANT CHANGE UP
HADV DNA SPEC QL NAA+PROBE: NOT DETECTED — SIGNIFICANT CHANGE UP
HCO3 BLDV-SCNC: 25 MMOL/L — SIGNIFICANT CHANGE UP (ref 20–27)
HCO3 BLDV-SCNC: 25 MMOL/L — SIGNIFICANT CHANGE UP (ref 20–27)
HCOV PNL SPEC NAA+PROBE: SIGNIFICANT CHANGE UP
HCT VFR BLD CALC: 41.7 % — SIGNIFICANT CHANGE UP (ref 39–50)
HCT VFR BLDV CALC: 38.1 % — LOW (ref 39–51)
HCT VFR BLDV CALC: 40.8 % — SIGNIFICANT CHANGE UP (ref 39–51)
HGB BLD-MCNC: 12.8 G/DL — LOW (ref 13–17)
HGB BLDV-MCNC: 12.4 G/DL — LOW (ref 13–17)
HGB BLDV-MCNC: 13.3 G/DL — SIGNIFICANT CHANGE UP (ref 13–17)
HMPV RNA SPEC QL NAA+PROBE: NOT DETECTED — SIGNIFICANT CHANGE UP
HPIV1 RNA SPEC QL NAA+PROBE: NOT DETECTED — SIGNIFICANT CHANGE UP
HPIV2 RNA SPEC QL NAA+PROBE: NOT DETECTED — SIGNIFICANT CHANGE UP
HPIV3 RNA SPEC QL NAA+PROBE: NOT DETECTED — SIGNIFICANT CHANGE UP
HPIV4 RNA SPEC QL NAA+PROBE: NOT DETECTED — SIGNIFICANT CHANGE UP
HYALINE CASTS # UR AUTO: NEGATIVE — SIGNIFICANT CHANGE UP
IMM GRANULOCYTES NFR BLD AUTO: 0.5 % — SIGNIFICANT CHANGE UP (ref 0–1.5)
INR BLD: 1.07 — SIGNIFICANT CHANGE UP (ref 0.88–1.17)
KETONES UR-MCNC: NEGATIVE — SIGNIFICANT CHANGE UP
LACTATE BLDV-MCNC: 2.6 MMOL/L — HIGH (ref 0.5–2)
LACTATE BLDV-MCNC: 3.7 MMOL/L — HIGH (ref 0.5–2)
LEUKOCYTE ESTERASE UR-ACNC: NEGATIVE — SIGNIFICANT CHANGE UP
LYMPHOCYTES # BLD AUTO: 1.43 K/UL — SIGNIFICANT CHANGE UP (ref 1–3.3)
LYMPHOCYTES # BLD AUTO: 16.1 % — SIGNIFICANT CHANGE UP (ref 13–44)
LYMPHOCYTES NFR SPEC AUTO: 14.9 % — SIGNIFICANT CHANGE UP (ref 13–44)
MACROCYTES BLD QL: SLIGHT — SIGNIFICANT CHANGE UP
MCHC RBC-ENTMCNC: 19.6 PG — LOW (ref 27–34)
MCHC RBC-ENTMCNC: 30.7 % — LOW (ref 32–36)
MCV RBC AUTO: 63.8 FL — LOW (ref 80–100)
METAMYELOCYTES # FLD: 0 % — SIGNIFICANT CHANGE UP (ref 0–1)
MICROCYTES BLD QL: SIGNIFICANT CHANGE UP
MONOCYTES # BLD AUTO: 0.68 K/UL — SIGNIFICANT CHANGE UP (ref 0–0.9)
MONOCYTES NFR BLD AUTO: 7.7 % — SIGNIFICANT CHANGE UP (ref 2–14)
MONOCYTES NFR BLD: 7 % — SIGNIFICANT CHANGE UP (ref 2–9)
MYELOCYTES NFR BLD: 0 % — SIGNIFICANT CHANGE UP (ref 0–0)
NEUTROPHIL AB SER-ACNC: 64 % — SIGNIFICANT CHANGE UP (ref 43–77)
NEUTROPHILS # BLD AUTO: 6.53 K/UL — SIGNIFICANT CHANGE UP (ref 1.8–7.4)
NEUTROPHILS NFR BLD AUTO: 73.6 % — SIGNIFICANT CHANGE UP (ref 43–77)
NEUTS BAND # BLD: 8.8 % — HIGH (ref 0–6)
NITRITE UR-MCNC: NEGATIVE — SIGNIFICANT CHANGE UP
NRBC # FLD: 0 K/UL — LOW (ref 25–125)
OTHER - HEMATOLOGY %: 0 — SIGNIFICANT CHANGE UP
PCO2 BLDV: 38 MMHG — LOW (ref 41–51)
PCO2 BLDV: 44 MMHG — SIGNIFICANT CHANGE UP (ref 41–51)
PH BLDV: 7.38 PH — SIGNIFICANT CHANGE UP (ref 7.32–7.43)
PH BLDV: 7.42 PH — SIGNIFICANT CHANGE UP (ref 7.32–7.43)
PH UR: 6 — SIGNIFICANT CHANGE UP (ref 5–8)
PLATELET # BLD AUTO: 381 K/UL — SIGNIFICANT CHANGE UP (ref 150–400)
PLATELET COUNT - ESTIMATE: NORMAL — SIGNIFICANT CHANGE UP
PMV BLD: 8.8 FL — SIGNIFICANT CHANGE UP (ref 7–13)
PO2 BLDV: 53 MMHG — HIGH (ref 35–40)
PO2 BLDV: 57 MMHG — HIGH (ref 35–40)
POIKILOCYTOSIS BLD QL AUTO: SIGNIFICANT CHANGE UP
POLYCHROMASIA BLD QL SMEAR: SIGNIFICANT CHANGE UP
POTASSIUM BLDV-SCNC: 4.2 MMOL/L — SIGNIFICANT CHANGE UP (ref 3.4–4.5)
POTASSIUM BLDV-SCNC: SIGNIFICANT CHANGE UP MMOL/L (ref 3.4–4.5)
POTASSIUM SERPL-MCNC: 4.4 MMOL/L — SIGNIFICANT CHANGE UP (ref 3.5–5.3)
POTASSIUM SERPL-SCNC: 4.4 MMOL/L — SIGNIFICANT CHANGE UP (ref 3.5–5.3)
PROMYELOCYTES # FLD: 0 % — SIGNIFICANT CHANGE UP (ref 0–0)
PROT SERPL-MCNC: 7.3 G/DL — SIGNIFICANT CHANGE UP (ref 6–8.3)
PROT UR-MCNC: 50 — SIGNIFICANT CHANGE UP
PROTHROM AB SERPL-ACNC: 12.2 SEC — SIGNIFICANT CHANGE UP (ref 9.8–13.1)
RBC # BLD: 6.54 M/UL — HIGH (ref 4.2–5.8)
RBC # FLD: 20.7 % — HIGH (ref 10.3–14.5)
RBC CASTS # UR COMP ASSIST: SIGNIFICANT CHANGE UP (ref 0–?)
REVIEW TO FOLLOW: YES — SIGNIFICANT CHANGE UP
RSV RNA SPEC QL NAA+PROBE: NOT DETECTED — SIGNIFICANT CHANGE UP
RV+EV RNA SPEC QL NAA+PROBE: NOT DETECTED — SIGNIFICANT CHANGE UP
SAO2 % BLDV: 86.8 % — HIGH (ref 60–85)
SAO2 % BLDV: 88.3 % — HIGH (ref 60–85)
SMUDGE CELLS # BLD: PRESENT — SIGNIFICANT CHANGE UP
SODIUM SERPL-SCNC: 139 MMOL/L — SIGNIFICANT CHANGE UP (ref 135–145)
SP GR SPEC: 1.02 — SIGNIFICANT CHANGE UP (ref 1–1.04)
SQUAMOUS # UR AUTO: SIGNIFICANT CHANGE UP
TARGETS BLD QL SMEAR: SLIGHT — SIGNIFICANT CHANGE UP
UROBILINOGEN FLD QL: NORMAL — SIGNIFICANT CHANGE UP
VARIANT LYMPHS # BLD: 4.4 % — SIGNIFICANT CHANGE UP
WBC # BLD: 8.87 K/UL — SIGNIFICANT CHANGE UP (ref 3.8–10.5)
WBC # FLD AUTO: 8.87 K/UL — SIGNIFICANT CHANGE UP (ref 3.8–10.5)
WBC UR QL: SIGNIFICANT CHANGE UP (ref 0–?)

## 2019-01-06 PROCEDURE — 99223 1ST HOSP IP/OBS HIGH 75: CPT | Mod: GC

## 2019-01-06 PROCEDURE — 71045 X-RAY EXAM CHEST 1 VIEW: CPT | Mod: 26

## 2019-01-06 RX ORDER — IPRATROPIUM/ALBUTEROL SULFATE 18-103MCG
3 AEROSOL WITH ADAPTER (GRAM) INHALATION ONCE
Qty: 0 | Refills: 0 | Status: COMPLETED | OUTPATIENT
Start: 2019-01-06 | End: 2019-01-06

## 2019-01-06 RX ORDER — IPRATROPIUM/ALBUTEROL SULFATE 18-103MCG
3 AEROSOL WITH ADAPTER (GRAM) INHALATION EVERY 6 HOURS
Qty: 0 | Refills: 0 | Status: DISCONTINUED | OUTPATIENT
Start: 2019-01-06 | End: 2019-01-06

## 2019-01-06 RX ORDER — DEXTROSE 50 % IN WATER 50 %
25 SYRINGE (ML) INTRAVENOUS ONCE
Qty: 0 | Refills: 0 | Status: DISCONTINUED | OUTPATIENT
Start: 2019-01-06 | End: 2019-01-10

## 2019-01-06 RX ORDER — DEXTROSE 50 % IN WATER 50 %
12.5 SYRINGE (ML) INTRAVENOUS ONCE
Qty: 0 | Refills: 0 | Status: DISCONTINUED | OUTPATIENT
Start: 2019-01-06 | End: 2019-01-10

## 2019-01-06 RX ORDER — HEPARIN SODIUM 5000 [USP'U]/ML
5000 INJECTION INTRAVENOUS; SUBCUTANEOUS EVERY 8 HOURS
Qty: 0 | Refills: 0 | Status: DISCONTINUED | OUTPATIENT
Start: 2019-01-06 | End: 2019-01-10

## 2019-01-06 RX ORDER — DOXAZOSIN MESYLATE 4 MG
2 TABLET ORAL AT BEDTIME
Qty: 0 | Refills: 0 | Status: DISCONTINUED | OUTPATIENT
Start: 2019-01-06 | End: 2019-01-10

## 2019-01-06 RX ORDER — FAMOTIDINE 10 MG/ML
20 INJECTION INTRAVENOUS DAILY
Qty: 0 | Refills: 0 | Status: DISCONTINUED | OUTPATIENT
Start: 2019-01-06 | End: 2019-01-10

## 2019-01-06 RX ORDER — INSULIN LISPRO 100/ML
VIAL (ML) SUBCUTANEOUS AT BEDTIME
Qty: 0 | Refills: 0 | Status: DISCONTINUED | OUTPATIENT
Start: 2019-01-06 | End: 2019-01-10

## 2019-01-06 RX ORDER — COLCHICINE 0.6 MG
0.6 TABLET ORAL DAILY
Qty: 0 | Refills: 0 | Status: DISCONTINUED | OUTPATIENT
Start: 2019-01-06 | End: 2019-01-10

## 2019-01-06 RX ORDER — PIPERACILLIN AND TAZOBACTAM 4; .5 G/20ML; G/20ML
3.38 INJECTION, POWDER, LYOPHILIZED, FOR SOLUTION INTRAVENOUS ONCE
Qty: 0 | Refills: 0 | Status: COMPLETED | OUTPATIENT
Start: 2019-01-06 | End: 2019-01-06

## 2019-01-06 RX ORDER — ATORVASTATIN CALCIUM 80 MG/1
1 TABLET, FILM COATED ORAL
Qty: 0 | Refills: 0 | COMMUNITY

## 2019-01-06 RX ORDER — DEXTROSE 50 % IN WATER 50 %
15 SYRINGE (ML) INTRAVENOUS ONCE
Qty: 0 | Refills: 0 | Status: DISCONTINUED | OUTPATIENT
Start: 2019-01-06 | End: 2019-01-10

## 2019-01-06 RX ORDER — CEFTRIAXONE 500 MG/1
INJECTION, POWDER, FOR SOLUTION INTRAMUSCULAR; INTRAVENOUS
Qty: 0 | Refills: 0 | Status: DISCONTINUED | OUTPATIENT
Start: 2019-01-06 | End: 2019-01-08

## 2019-01-06 RX ORDER — SODIUM CHLORIDE 9 MG/ML
1000 INJECTION, SOLUTION INTRAVENOUS
Qty: 0 | Refills: 0 | Status: DISCONTINUED | OUTPATIENT
Start: 2019-01-06 | End: 2019-01-10

## 2019-01-06 RX ORDER — PIPERACILLIN AND TAZOBACTAM 4; .5 G/20ML; G/20ML
3.38 INJECTION, POWDER, LYOPHILIZED, FOR SOLUTION INTRAVENOUS EVERY 8 HOURS
Qty: 0 | Refills: 0 | Status: DISCONTINUED | OUTPATIENT
Start: 2019-01-06 | End: 2019-01-06

## 2019-01-06 RX ORDER — ATORVASTATIN CALCIUM 80 MG/1
10 TABLET, FILM COATED ORAL AT BEDTIME
Qty: 0 | Refills: 0 | Status: DISCONTINUED | OUTPATIENT
Start: 2019-01-06 | End: 2019-01-10

## 2019-01-06 RX ORDER — FOLIC ACID 0.8 MG
1 TABLET ORAL DAILY
Qty: 0 | Refills: 0 | Status: DISCONTINUED | OUTPATIENT
Start: 2019-01-06 | End: 2019-01-10

## 2019-01-06 RX ORDER — ACETAMINOPHEN 500 MG
975 TABLET ORAL ONCE
Qty: 0 | Refills: 0 | Status: COMPLETED | OUTPATIENT
Start: 2019-01-06 | End: 2019-01-06

## 2019-01-06 RX ORDER — ASPIRIN/CALCIUM CARB/MAGNESIUM 324 MG
81 TABLET ORAL DAILY
Qty: 0 | Refills: 0 | Status: DISCONTINUED | OUTPATIENT
Start: 2019-01-06 | End: 2019-01-10

## 2019-01-06 RX ORDER — IPRATROPIUM/ALBUTEROL SULFATE 18-103MCG
3 AEROSOL WITH ADAPTER (GRAM) INHALATION EVERY 6 HOURS
Qty: 0 | Refills: 0 | Status: DISCONTINUED | OUTPATIENT
Start: 2019-01-06 | End: 2019-01-10

## 2019-01-06 RX ORDER — ACETAMINOPHEN 500 MG
650 TABLET ORAL ONCE
Qty: 0 | Refills: 0 | Status: DISCONTINUED | OUTPATIENT
Start: 2019-01-06 | End: 2019-01-06

## 2019-01-06 RX ORDER — OMEPRAZOLE 10 MG/1
1 CAPSULE, DELAYED RELEASE ORAL
Qty: 0 | Refills: 0 | COMMUNITY

## 2019-01-06 RX ORDER — VANCOMYCIN HCL 1 G
1000 VIAL (EA) INTRAVENOUS ONCE
Qty: 0 | Refills: 0 | Status: COMPLETED | OUTPATIENT
Start: 2019-01-06 | End: 2019-01-06

## 2019-01-06 RX ORDER — ALLOPURINOL 300 MG
100 TABLET ORAL DAILY
Qty: 0 | Refills: 0 | Status: DISCONTINUED | OUTPATIENT
Start: 2019-01-06 | End: 2019-01-10

## 2019-01-06 RX ORDER — AZITHROMYCIN 500 MG/1
500 TABLET, FILM COATED ORAL EVERY 24 HOURS
Qty: 0 | Refills: 0 | Status: DISCONTINUED | OUTPATIENT
Start: 2019-01-07 | End: 2019-01-08

## 2019-01-06 RX ORDER — FUROSEMIDE 40 MG
1 TABLET ORAL
Qty: 0 | Refills: 0 | COMMUNITY

## 2019-01-06 RX ORDER — AZITHROMYCIN 500 MG/1
500 TABLET, FILM COATED ORAL ONCE
Qty: 0 | Refills: 0 | Status: COMPLETED | OUTPATIENT
Start: 2019-01-06 | End: 2019-01-06

## 2019-01-06 RX ORDER — GLUCAGON INJECTION, SOLUTION 0.5 MG/.1ML
1 INJECTION, SOLUTION SUBCUTANEOUS ONCE
Qty: 0 | Refills: 0 | Status: DISCONTINUED | OUTPATIENT
Start: 2019-01-06 | End: 2019-01-10

## 2019-01-06 RX ORDER — FAMOTIDINE 10 MG/ML
0 INJECTION INTRAVENOUS
Qty: 0 | Refills: 0 | COMMUNITY

## 2019-01-06 RX ORDER — SODIUM CHLORIDE 9 MG/ML
2000 INJECTION, SOLUTION INTRAVENOUS ONCE
Qty: 0 | Refills: 0 | Status: DISCONTINUED | OUTPATIENT
Start: 2019-01-06 | End: 2019-01-06

## 2019-01-06 RX ORDER — FOLIC ACID 0.8 MG
1 TABLET ORAL
Qty: 0 | Refills: 0 | COMMUNITY

## 2019-01-06 RX ORDER — COLCHICINE 0.6 MG
1 TABLET ORAL
Qty: 0 | Refills: 0 | COMMUNITY

## 2019-01-06 RX ORDER — FUROSEMIDE 40 MG
40 TABLET ORAL DAILY
Qty: 0 | Refills: 0 | Status: DISCONTINUED | OUTPATIENT
Start: 2019-01-06 | End: 2019-01-09

## 2019-01-06 RX ORDER — CEFTRIAXONE 500 MG/1
1 INJECTION, POWDER, FOR SOLUTION INTRAMUSCULAR; INTRAVENOUS ONCE
Qty: 0 | Refills: 0 | Status: COMPLETED | OUTPATIENT
Start: 2019-01-06 | End: 2019-01-06

## 2019-01-06 RX ORDER — CEFTRIAXONE 500 MG/1
1 INJECTION, POWDER, FOR SOLUTION INTRAMUSCULAR; INTRAVENOUS EVERY 24 HOURS
Qty: 0 | Refills: 0 | Status: DISCONTINUED | OUTPATIENT
Start: 2019-01-07 | End: 2019-01-08

## 2019-01-06 RX ORDER — PANTOPRAZOLE SODIUM 20 MG/1
40 TABLET, DELAYED RELEASE ORAL
Qty: 0 | Refills: 0 | Status: DISCONTINUED | OUTPATIENT
Start: 2019-01-06 | End: 2019-01-10

## 2019-01-06 RX ORDER — ASPIRIN/CALCIUM CARB/MAGNESIUM 324 MG
1 TABLET ORAL
Qty: 0 | Refills: 0 | COMMUNITY

## 2019-01-06 RX ORDER — DOXAZOSIN MESYLATE 4 MG
1 TABLET ORAL
Qty: 0 | Refills: 0 | COMMUNITY

## 2019-01-06 RX ORDER — SODIUM CHLORIDE 9 MG/ML
2000 INJECTION INTRAMUSCULAR; INTRAVENOUS; SUBCUTANEOUS ONCE
Qty: 0 | Refills: 0 | Status: COMPLETED | OUTPATIENT
Start: 2019-01-06 | End: 2019-01-06

## 2019-01-06 RX ORDER — ALLOPURINOL 300 MG
0 TABLET ORAL
Qty: 0 | Refills: 0 | COMMUNITY

## 2019-01-06 RX ORDER — INSULIN LISPRO 100/ML
VIAL (ML) SUBCUTANEOUS
Qty: 0 | Refills: 0 | Status: DISCONTINUED | OUTPATIENT
Start: 2019-01-06 | End: 2019-01-10

## 2019-01-06 RX ADMIN — Medication 3 MILLILITER(S): at 14:40

## 2019-01-06 RX ADMIN — Medication 3 MILLILITER(S): at 15:58

## 2019-01-06 RX ADMIN — Medication 975 MILLIGRAM(S): at 14:40

## 2019-01-06 RX ADMIN — Medication 3 MILLILITER(S): at 14:41

## 2019-01-06 RX ADMIN — PIPERACILLIN AND TAZOBACTAM 200 GRAM(S): 4; .5 INJECTION, POWDER, LYOPHILIZED, FOR SOLUTION INTRAVENOUS at 14:17

## 2019-01-06 RX ADMIN — Medication 2 MILLIGRAM(S): at 22:13

## 2019-01-06 RX ADMIN — SODIUM CHLORIDE 2000 MILLILITER(S): 9 INJECTION INTRAMUSCULAR; INTRAVENOUS; SUBCUTANEOUS at 15:57

## 2019-01-06 RX ADMIN — Medication 250 MILLIGRAM(S): at 15:06

## 2019-01-06 RX ADMIN — HEPARIN SODIUM 5000 UNIT(S): 5000 INJECTION INTRAVENOUS; SUBCUTANEOUS at 22:12

## 2019-01-06 RX ADMIN — PIPERACILLIN AND TAZOBACTAM 3.38 GRAM(S): 4; .5 INJECTION, POWDER, LYOPHILIZED, FOR SOLUTION INTRAVENOUS at 15:57

## 2019-01-06 RX ADMIN — CEFTRIAXONE 100 GRAM(S): 500 INJECTION, POWDER, FOR SOLUTION INTRAMUSCULAR; INTRAVENOUS at 20:13

## 2019-01-06 RX ADMIN — ATORVASTATIN CALCIUM 10 MILLIGRAM(S): 80 TABLET, FILM COATED ORAL at 22:12

## 2019-01-06 RX ADMIN — Medication 1000 MILLIGRAM(S): at 15:57

## 2019-01-06 RX ADMIN — Medication 975 MILLIGRAM(S): at 15:57

## 2019-01-06 RX ADMIN — AZITHROMYCIN 250 MILLIGRAM(S): 500 TABLET, FILM COATED ORAL at 16:15

## 2019-01-06 RX ADMIN — SODIUM CHLORIDE 2000 MILLILITER(S): 9 INJECTION INTRAMUSCULAR; INTRAVENOUS; SUBCUTANEOUS at 14:40

## 2019-01-06 NOTE — H&P ADULT - NSHPREVIEWOFSYSTEMS_GEN_ALL_CORE
REVIEW OF SYSTEMS:    CONSTITUTIONAL: + fevers or chills,   EYES/ENT: No visual changes;  No vertigo or throat pain   NECK: No pain or stiffness  RESPIRATORY: + productive cough w/ yellow sputum, no wheezing, hemoptysis; + shortness of breath  CARDIOVASCULAR: No chest pain or palpitations  GASTROINTESTINAL: No abdominal pain; nausea, vomiting;  diarrhea or constipation. No hematemesis, melena or hematochezia. Decreased appetite   GENITOURINARY: No dysuria, frequency or hematuria  NEUROLOGICAL: No numbness or weakness  SKIN: No itching, burning, rashes, or lesions   All other review of systems is negative unless indicated above.

## 2019-01-06 NOTE — H&P ADULT - ATTENDING COMMENTS
83M with PMHx SHERMAN, DM, obesity, presented with productive cough x3 days associated with fever and SOB. sepsis due to CAP, empirically treatment with ceftriaxone and Azithromycin (can be d/c if legionella is negative). agrees to get CT lung for better evaluation. given clinical exam of peripheral edema and medical history, concern for underline CHF as contributory factor for respiratory complaint. check BNP and TTE. hemodynamically stable, can c/w home HTN meds. c/w gout prophylaxis home meds.

## 2019-01-06 NOTE — ED PROVIDER NOTE - MEDICAL DECISION MAKING DETAILS
83m w hx HTN, DM here c/o cough and fever x2 days. Exam s/f obese male in distress, febrile tachycardic to 140s and tachypneic meeting sepsis criteria. Labs, ua, cxr, fluid, abx, adm

## 2019-01-06 NOTE — H&P ADULT - NSHPLABSRESULTS_GEN_ALL_CORE
Personally reviewed labs.   Personally reviewed imaging.   Personally reviewed EKG.                           12.8   8.87  )-----------( 381      ( 2019 14:00 )             41.7           139  |  102  |  15  ----------------------------<  221<H>  4.4   |  22  |  1.39<H>    Ca    10.3      2019 14:00    TPro  7.3  /  Alb  3.7  /  TBili  0.3  /  DBili  x   /  AST  67<H>  /  ALT  54<H>  /  AlkPhos  89  01-06            LIVER FUNCTIONS - ( 2019 14:00 )  Alb: 3.7 g/dL / Pro: 7.3 g/dL / ALK PHOS: 89 u/L / ALT: 54 u/L / AST: 67 u/L / GGT: x             PT/INR - ( 2019 14:00 )   PT: 12.2 SEC;   INR: 1.07              Urinalysis Basic - ( 2019 17:35 )    Color: YELLOW / Appearance: CLEAR / S.016 / pH: 6.0  Gluc: NEGATIVE / Ketone: NEGATIVE  / Bili: NEGATIVE / Urobili: NORMAL   Blood: NEGATIVE / Protein: 50 / Nitrite: NEGATIVE   Leuk Esterase: NEGATIVE / RBC: 0-2 / WBC 0-2   Sq Epi: OCC / Non Sq Epi: x / Bacteria: NEGATIVE

## 2019-01-06 NOTE — H&P ADULT - NSHPSOCIALHISTORY_GEN_ALL_CORE
Patient denies any smoking history, no alcohol use. Lives with niece, granddaughter is home health aide.

## 2019-01-06 NOTE — H&P ADULT - NSHPPHYSICALEXAM_GEN_ALL_CORE
PHYSICAL EXAM:    Vital Signs Last 24 Hrs  T(C): 39.4 (06 Jan 2019 14:19), Max: 39.4 (06 Jan 2019 12:38)  T(F): 103 (06 Jan 2019 14:19), Max: 103 (06 Jan 2019 12:38)  HR: 130 (06 Jan 2019 14:19) (120 - 130)  BP: 120/80 (06 Jan 2019 14:19) (120/80 - 143/67)  BP(mean): --  RR: 24 (06 Jan 2019 14:20) (18 - 24)  SpO2: 99% (06 Jan 2019 14:20) (93% - 99%)    General: No acute distress.  HEENT: No scleral icterus or injection.  Neck: Supple.  Full ROM.  No JVD.  No thyromegaly. No lymphadenopathy.   Heart: RRR.  Normal S1 and S2.  No murmurs, rubs, or gallops.   Lungs: Poor inspiratory effort, upper airway sounds transmitted     Abdomen: BS+, soft, NT/ND.  No organomegaly. +wheezes  Skin: Warm and dry.  No rashes.  Extremities: 1+ b/l pitting edema to below knee. 2+ peripheral pulses b/l.  Musculoskeletal: No deformities.  No spinal or paraspinal tenderness.  Neuro: A&Ox3

## 2019-01-06 NOTE — H&P ADULT - PROBLEM SELECTOR PLAN 1
- patient febrile to 103, , elevated lactate, meets sepsis criteria  - UA negative, RVP neg, pending CT chest, CXR poor study  - empirically treat for CAP with ceftriaxone, and azithro  - pending urine legionella

## 2019-01-06 NOTE — H&P ADULT - ASSESSMENT
83 M with PMH of HTN, DM, obstructive sleep apnea on CPAP at night, gout, presenting with fever and productive cough, and shortness of breath meets sepsis criteria with fever, tachycardia, and elevated lactate, with likely source being PNA, pending CT chest. 83 M with PMH of HTN, DM, obstructive sleep apnea on CPAP at night, gout, presenting with fever and productive cough, and shortness of breath meets sepsis criteria with fever, tachycardia, and elevated lactate, with likely source being PNA, pending CT chest, will empirically treat with azithro and ceftriaxone for CAP

## 2019-01-06 NOTE — H&P ADULT - PROBLEM SELECTOR PLAN 2
- most likely 2/2 to PNA and obstructive sleep apnea  - need to rule out cardiac cause, may have component of HF  - f/u BNP  - TTE

## 2019-01-06 NOTE — ED ADULT TRIAGE NOTE - CHIEF COMPLAINT QUOTE
Pt complaining of fever, chills and productive cough since Friday. Pt denies chest pain, SOB, N/V/D, fever or chills.

## 2019-01-06 NOTE — H&P ADULT - HISTORY OF PRESENT ILLNESS
83 M with PMH of HTN, DM, obstructive sleep apnea on CPAP at night, gout, presenting with fever and productive cough, and shortness of breath for past few days. Patient is a poor historian and history was obtained by son at bedside. Patient was recently seen in the ED for sore throat and was discharged with antibiotics, not admitted. 83 M with PMH of HTN, DM, obstructive sleep apnea on CPAP at night, gout, presenting with fever and productive cough, and shortness of breath for past few days. Patient is a poor historian and history was obtained by son at bedside. Patient has been having poor po intake due to not feeling well. Denies any chest pain, nausea, vomiting, or abdominal pain. He does have a sick contact, his niece recently had flu like symptoms. Patient did have pneumonia in the past. No recent travels.

## 2019-01-06 NOTE — ED PROVIDER NOTE - ATTENDING CONTRIBUTION TO CARE
elida: hx in part from son at bedside.   hospitalized one year ago for pneumonia. treated at home for pneumonia approx 6 months ago, and approx two week ago treated for an oral/throat infection. Now has increasing cough shortness or breath and fever for 2 days.   exam: rr in the 20s. Talks and is alert and cooperative.   exam otherwise unremarkable.  cxr, incl BC and rvp and labs obtained.  will start antibiotics and fluids.

## 2019-01-06 NOTE — ED PROVIDER NOTE - OBJECTIVE STATEMENT
83m w hx HTN, DM here c/o cough and fever x2 days. Also w associated SOB, sore throat; no cp. No known sick contacts. No n/v/d.     PMD-Josue

## 2019-01-06 NOTE — ED ADULT NURSE NOTE - OBJECTIVE STATEMENT
Pt arrives with cough tachypneic 02 sat 93% room air placed on nasal o2 for increased comfort. Pt medicated as ordered for fever. Pt s labs drawn awaiting rvp and xray results, fluids infusing neb treatments in progress .

## 2019-01-07 LAB
ALBUMIN SERPL ELPH-MCNC: 3.2 G/DL — LOW (ref 3.3–5)
ALP SERPL-CCNC: 69 U/L — SIGNIFICANT CHANGE UP (ref 40–120)
ALT FLD-CCNC: 48 U/L — HIGH (ref 4–41)
AST SERPL-CCNC: 49 U/L — HIGH (ref 4–40)
BASE EXCESS BLDV CALC-SCNC: 3.3 MMOL/L — SIGNIFICANT CHANGE UP
BILIRUB SERPL-MCNC: 0.5 MG/DL — SIGNIFICANT CHANGE UP (ref 0.2–1.2)
BLOOD GAS VENOUS - CREATININE: 1.23 MG/DL — SIGNIFICANT CHANGE UP (ref 0.5–1.3)
BUN SERPL-MCNC: 15 MG/DL — SIGNIFICANT CHANGE UP (ref 7–23)
CALCIUM SERPL-MCNC: 10.3 MG/DL — SIGNIFICANT CHANGE UP (ref 8.4–10.5)
CHLORIDE BLDV-SCNC: 109 MMOL/L — HIGH (ref 96–108)
CHLORIDE SERPL-SCNC: 103 MMOL/L — SIGNIFICANT CHANGE UP (ref 98–107)
CO2 SERPL-SCNC: 26 MMOL/L — SIGNIFICANT CHANGE UP (ref 22–31)
CREAT SERPL-MCNC: 1.42 MG/DL — HIGH (ref 0.5–1.3)
FERRITIN SERPL-MCNC: 148.5 NG/ML — SIGNIFICANT CHANGE UP (ref 30–400)
GAS PNL BLDV: 136 MMOL/L — SIGNIFICANT CHANGE UP (ref 136–146)
GLUCOSE BLDC GLUCOMTR-MCNC: 127 MG/DL — HIGH (ref 70–99)
GLUCOSE BLDV-MCNC: 106 — HIGH (ref 70–99)
GLUCOSE SERPL-MCNC: 104 MG/DL — HIGH (ref 70–99)
HBA1C BLD-MCNC: 8 % — HIGH (ref 4–5.6)
HCO3 BLDV-SCNC: 26 MMOL/L — SIGNIFICANT CHANGE UP (ref 20–27)
HCT VFR BLDV CALC: 43.2 % — SIGNIFICANT CHANGE UP (ref 39–51)
HGB BLDV-MCNC: 14.1 G/DL — SIGNIFICANT CHANGE UP (ref 13–17)
IRON SATN MFR SERPL: 21 UG/DL — LOW (ref 45–165)
IRON SATN MFR SERPL: 221 UG/DL — SIGNIFICANT CHANGE UP (ref 155–535)
LACTATE BLDV-MCNC: 2.2 MMOL/L — HIGH (ref 0.5–2)
MAGNESIUM SERPL-MCNC: 1.7 MG/DL — SIGNIFICANT CHANGE UP (ref 1.6–2.6)
METHOD TYPE: SIGNIFICANT CHANGE UP
NT-PROBNP SERPL-SCNC: 129.9 PG/ML — SIGNIFICANT CHANGE UP
ORGANISM # SPEC MICROSCOPIC CNT: SIGNIFICANT CHANGE UP
ORGANISM # SPEC MICROSCOPIC CNT: SIGNIFICANT CHANGE UP
PCO2 BLDV: 51 MMHG — SIGNIFICANT CHANGE UP (ref 41–51)
PH BLDV: 7.36 PH — SIGNIFICANT CHANGE UP (ref 7.32–7.43)
PHOSPHATE SERPL-MCNC: 2.9 MG/DL — SIGNIFICANT CHANGE UP (ref 2.5–4.5)
PO2 BLDV: 47 MMHG — HIGH (ref 35–40)
POTASSIUM BLDV-SCNC: 3.7 MMOL/L — SIGNIFICANT CHANGE UP (ref 3.4–4.5)
POTASSIUM SERPL-MCNC: 4.2 MMOL/L — SIGNIFICANT CHANGE UP (ref 3.5–5.3)
POTASSIUM SERPL-SCNC: 4.2 MMOL/L — SIGNIFICANT CHANGE UP (ref 3.5–5.3)
PROT SERPL-MCNC: 6.2 G/DL — SIGNIFICANT CHANGE UP (ref 6–8.3)
SAO2 % BLDV: 79 % — SIGNIFICANT CHANGE UP (ref 60–85)
SODIUM SERPL-SCNC: 141 MMOL/L — SIGNIFICANT CHANGE UP (ref 135–145)
SPECIMEN SOURCE: SIGNIFICANT CHANGE UP
UIBC SERPL-MCNC: 199.7 UG/DL — SIGNIFICANT CHANGE UP (ref 110–370)

## 2019-01-07 PROCEDURE — 71250 CT THORAX DX C-: CPT | Mod: 26

## 2019-01-07 PROCEDURE — 99233 SBSQ HOSP IP/OBS HIGH 50: CPT | Mod: GC

## 2019-01-07 RX ADMIN — HEPARIN SODIUM 5000 UNIT(S): 5000 INJECTION INTRAVENOUS; SUBCUTANEOUS at 14:17

## 2019-01-07 RX ADMIN — AZITHROMYCIN 250 MILLIGRAM(S): 500 TABLET, FILM COATED ORAL at 12:04

## 2019-01-07 RX ADMIN — Medication 81 MILLIGRAM(S): at 14:17

## 2019-01-07 RX ADMIN — Medication 2 MILLIGRAM(S): at 22:10

## 2019-01-07 RX ADMIN — CEFTRIAXONE 100 GRAM(S): 500 INJECTION, POWDER, FOR SOLUTION INTRAMUSCULAR; INTRAVENOUS at 19:40

## 2019-01-07 RX ADMIN — HEPARIN SODIUM 5000 UNIT(S): 5000 INJECTION INTRAVENOUS; SUBCUTANEOUS at 22:10

## 2019-01-07 RX ADMIN — PANTOPRAZOLE SODIUM 40 MILLIGRAM(S): 20 TABLET, DELAYED RELEASE ORAL at 05:59

## 2019-01-07 RX ADMIN — HEPARIN SODIUM 5000 UNIT(S): 5000 INJECTION INTRAVENOUS; SUBCUTANEOUS at 05:58

## 2019-01-07 RX ADMIN — Medication 10 MILLIGRAM(S): at 05:58

## 2019-01-07 RX ADMIN — Medication 40 MILLIGRAM(S): at 05:57

## 2019-01-07 RX ADMIN — Medication 0.6 MILLIGRAM(S): at 14:17

## 2019-01-07 RX ADMIN — ATORVASTATIN CALCIUM 10 MILLIGRAM(S): 80 TABLET, FILM COATED ORAL at 22:10

## 2019-01-07 RX ADMIN — Medication 100 MILLIGRAM(S): at 12:04

## 2019-01-07 RX ADMIN — Medication 1 MILLIGRAM(S): at 12:04

## 2019-01-07 RX ADMIN — FAMOTIDINE 20 MILLIGRAM(S): 10 INJECTION INTRAVENOUS at 12:04

## 2019-01-07 NOTE — PROGRESS NOTE ADULT - SUBJECTIVE AND OBJECTIVE BOX
Frances Agee, PGY1  Pager: [phone]  After 7: Night Float pager  Alternate contact:     Patient is a 83y old  Male who presents with a chief complaint of fever, cough (2019 18:31)      SUBJECTIVE / OVERNIGHT EVENTS: No acute events overnight. Patient says that he is feeling a lot better today. He still has the productive cough, but improving. Denies any chest pain or shortness of breath. No nausea, vomiting, or abdominal pain.     MEDICATIONS  (STANDING):  allopurinol 100 milliGRAM(s) Oral daily  aspirin enteric coated 81 milliGRAM(s) Oral daily  atorvastatin 10 milliGRAM(s) Oral at bedtime  azithromycin  IVPB 500 milliGRAM(s) IV Intermittent every 24 hours  cefTRIAXone   IVPB      cefTRIAXone   IVPB 1 Gram(s) IV Intermittent every 24 hours  colchicine 0.6 milliGRAM(s) Oral daily  dextrose 5%. 1000 milliLiter(s) (50 mL/Hr) IV Continuous <Continuous>  dextrose 50% Injectable 12.5 Gram(s) IV Push once  dextrose 50% Injectable 25 Gram(s) IV Push once  dextrose 50% Injectable 25 Gram(s) IV Push once  doxazosin 2 milliGRAM(s) Oral at bedtime  enalapril 10 milliGRAM(s) Oral daily  famotidine    Tablet 20 milliGRAM(s) Oral daily  folic acid 1 milliGRAM(s) Oral daily  furosemide    Tablet 40 milliGRAM(s) Oral daily  heparin  Injectable 5000 Unit(s) SubCutaneous every 8 hours  insulin lispro (HumaLOG) corrective regimen sliding scale   SubCutaneous three times a day before meals  insulin lispro (HumaLOG) corrective regimen sliding scale   SubCutaneous at bedtime  pantoprazole    Tablet 40 milliGRAM(s) Oral before breakfast    MEDICATIONS  (PRN):  ALBUTerol/ipratropium for Nebulization 3 milliLiter(s) Nebulizer every 6 hours PRN Shortness of Breath and/or Wheezing  dextrose 40% Gel 15 Gram(s) Oral once PRN Blood Glucose LESS THAN 70 milliGRAM(s)/deciliter  glucagon  Injectable 1 milliGRAM(s) IntraMuscular once PRN Glucose LESS THAN 70 milligrams/deciliter      Vital Signs Last 24 Hrs  T(C): 36.6 (2019 05:19), Max: 39.4 (2019 14:19)  T(F): 97.8 (2019 05:19), Max: 103 (2019 14:19)  HR: 82 (2019 11:04) (75 - 130)  BP: 132/73 (2019 05:19) (120/80 - 132/73)  BP(mean): --  RR: 16 (2019 05:19) (16 - 24)  SpO2: 96% (2019 11:04) (93% - 99%)  CAPILLARY BLOOD GLUCOSE      POCT Blood Glucose.: 127 mg/dL (2019 07:44)  POCT Blood Glucose.: 90 mg/dL (2019 21:40)    I&O's Summary    2019 07:01  -  2019 07:00  --------------------------------------------------------  IN: 50 mL / OUT: 0 mL / NET: 50 mL        PHYSICAL EXAM:  GENERAL: NAD, well-developed  EYES: EOMI, PERRLA, conjunctiva and sclera clear  NECK:  No JVD  CHEST/LUNG: poor inspiratory effort, no wheezes, no crackles  HEART: RRR; No murmurs  ABDOMEN: Soft, Nontender, + distended; Bowel sounds present  : No Murillo  EXTREMITIES:  2+ Peripheral Pulses,1+ pitting edema bilaterally  PSYCH: AAOx3  NEUROLOGY: non-focal  SKIN: No rashes or lesions. No sacral ulcer    LABS:                        12.8   8.87  )-----------( 381      ( 2019 14:00 )             41.7     01-07    141  |  103  |  15  ----------------------------<  104<H>  4.2   |  26  |  1.42<H>    Ca    10.3      2019 05:40  Phos  2.9       Mg     1.7     07    TPro  6.2  /  Alb  3.2<L>  /  TBili  0.5  /  DBili  x   /  AST  49<H>  /  ALT  48<H>  /  AlkPhos  69  07    PT/INR - ( 2019 14:00 )   PT: 12.2 SEC;   INR: 1.07                Urinalysis Basic - ( 2019 17:35 )    Color: YELLOW / Appearance: CLEAR / S.016 / pH: 6.0  Gluc: NEGATIVE / Ketone: NEGATIVE  / Bili: NEGATIVE / Urobili: NORMAL   Blood: NEGATIVE / Protein: 50 / Nitrite: NEGATIVE   Leuk Esterase: NEGATIVE / RBC: 0-2 / WBC 0-2   Sq Epi: OCC / Non Sq Epi: x / Bacteria: NEGATIVE        Microbiology;    Culture - Urine (collected 19 @ 18:08)  Source: URINE MIDSTREAM  Preliminary Report (19 @ 10:00):    NO GROWTH TO DATE    Culture - Blood (collected 19 @ 14:22)  Source: BLOOD VENOUS  Preliminary Report (19 @ 11:47):    ***Blood Panel PCR results on this specimen are available    approximately 3 hours after the Gram stain result***    Gram stain, PCR, and/or culture results may not always    correspond due to difference in methodologies    ------------------------------------------------------------    This PCR assay was performed using [employer].  The    following targets are tested for:  Enterococcus, vancomycin    resistant enterococci, Listeria monocytogenes,  coagulase    negative staphylococci, S. aureus, methicillin resistant S.    aureus, Streptococcus agalactiae (Group B), S. pneumoniae,    S. pyogenes (Group A), Acinetobacter baumannii, Enterobacter    cloacae, E. coli, Klebsiella oxytoca, K. pneumoniae, Proteus    sp., Serratia marcescens, Haemophilus influenzae, Neisseria    meningitidis, Pseudomonas aeruginosa, Candida albicans, C.    glabrata, C. krusei, C. parapsilosis, C. tropicalis and the    KPC resistance gene.    **NOTE: Due to technical problems, Proteus sp. will NOT be    reported as part of the BCID paneluntil further notice.  Organism: BLOOD CULTURE PCR (19 @ 11:47)  Organism: BLOOD CULTURE PCR (19 @ 11:47)      -  Streptococcus sp. (Not Grp A, B or S pneumoniae): + DETECT THI      Method Type: PCR

## 2019-01-07 NOTE — PROGRESS NOTE ADULT - PROBLEM SELECTOR PLAN 2
- most likely 2/2 to PNA and obstructive sleep apnea  - continue Abx, duonebs prn as needed  - need to rule out cardiac cause, may have component of HF  - BNP not elevated  - TTE pending

## 2019-01-07 NOTE — PROGRESS NOTE ADULT - PROBLEM SELECTOR PLAN 3
- CKD stage 3 most likely 2/2 to diabetic nephropathy, Cr 1.39 on admission, close to baseline   - continue to trend

## 2019-01-07 NOTE — PROGRESS NOTE ADULT - PROBLEM SELECTOR PLAN 5
- most likely 2/2 to sepsis with elevated lactate  - rule out Urine Legionella  - transaminitis improving

## 2019-01-07 NOTE — PROGRESS NOTE ADULT - PROBLEM SELECTOR PLAN 1
- patient febrile to 103, , elevated lactate, mets sepsis criteria on admission  - UA negative, RVP neg, pending CT chest, CXR poor study  - empirically treat for CAP with ceftriaxone, and azithro  - pending urine legionella  - one set of Blood cultures growing streptococcus, will repeat blood cultures  - lactate downtrending

## 2019-01-07 NOTE — PROGRESS NOTE ADULT - ASSESSMENT
83 M with PMH of HTN, DM, obstructive sleep apnea on CPAP at night, gout, presenting with fever and productive cough, and shortness of breath meets sepsis criteria with fever, tachycardia, and elevated lactate, with likely source being PNA, pending CT chest, will empirically treat with azithro and ceftriaxone for CAP

## 2019-01-08 DIAGNOSIS — R78.81 BACTEREMIA: ICD-10-CM

## 2019-01-08 DIAGNOSIS — Z71.89 OTHER SPECIFIED COUNSELING: ICD-10-CM

## 2019-01-08 DIAGNOSIS — K86.9 DISEASE OF PANCREAS, UNSPECIFIED: ICD-10-CM

## 2019-01-08 LAB
-  STREPTOCOCCUS SP. (NOT GRP A, B OR S PNEUMONIAE): SIGNIFICANT CHANGE UP
BACTERIA BLD CULT: SIGNIFICANT CHANGE UP
BACTERIA UR CULT: SIGNIFICANT CHANGE UP
BUN SERPL-MCNC: 17 MG/DL — SIGNIFICANT CHANGE UP (ref 7–23)
CALCIUM SERPL-MCNC: 10.5 MG/DL — SIGNIFICANT CHANGE UP (ref 8.4–10.5)
CEA SERPL-MCNC: 10 NG/ML — HIGH (ref 1–3.8)
CHLORIDE SERPL-SCNC: 102 MMOL/L — SIGNIFICANT CHANGE UP (ref 98–107)
CO2 SERPL-SCNC: 24 MMOL/L — SIGNIFICANT CHANGE UP (ref 22–31)
CREAT SERPL-MCNC: 1.49 MG/DL — HIGH (ref 0.5–1.3)
GLUCOSE SERPL-MCNC: 108 MG/DL — HIGH (ref 70–99)
MAGNESIUM SERPL-MCNC: 1.7 MG/DL — SIGNIFICANT CHANGE UP (ref 1.6–2.6)
ORGANISM # SPEC MICROSCOPIC CNT: SIGNIFICANT CHANGE UP
PHOSPHATE SERPL-MCNC: 2.7 MG/DL — SIGNIFICANT CHANGE UP (ref 2.5–4.5)
POTASSIUM SERPL-MCNC: 4.1 MMOL/L — SIGNIFICANT CHANGE UP (ref 3.5–5.3)
POTASSIUM SERPL-SCNC: 4.1 MMOL/L — SIGNIFICANT CHANGE UP (ref 3.5–5.3)
SODIUM SERPL-SCNC: 139 MMOL/L — SIGNIFICANT CHANGE UP (ref 135–145)

## 2019-01-08 PROCEDURE — 99233 SBSQ HOSP IP/OBS HIGH 50: CPT | Mod: GC

## 2019-01-08 RX ORDER — CEFTRIAXONE 500 MG/1
2 INJECTION, POWDER, FOR SOLUTION INTRAMUSCULAR; INTRAVENOUS EVERY 24 HOURS
Qty: 0 | Refills: 0 | Status: DISCONTINUED | OUTPATIENT
Start: 2019-01-08 | End: 2019-01-10

## 2019-01-08 RX ADMIN — CEFTRIAXONE 100 GRAM(S): 500 INJECTION, POWDER, FOR SOLUTION INTRAMUSCULAR; INTRAVENOUS at 19:08

## 2019-01-08 RX ADMIN — HEPARIN SODIUM 5000 UNIT(S): 5000 INJECTION INTRAVENOUS; SUBCUTANEOUS at 15:18

## 2019-01-08 RX ADMIN — Medication 0.6 MILLIGRAM(S): at 19:00

## 2019-01-08 RX ADMIN — Medication 81 MILLIGRAM(S): at 15:17

## 2019-01-08 RX ADMIN — Medication 1 MILLIGRAM(S): at 15:17

## 2019-01-08 RX ADMIN — Medication 100 MILLIGRAM(S): at 15:17

## 2019-01-08 RX ADMIN — HEPARIN SODIUM 5000 UNIT(S): 5000 INJECTION INTRAVENOUS; SUBCUTANEOUS at 06:08

## 2019-01-08 RX ADMIN — ATORVASTATIN CALCIUM 10 MILLIGRAM(S): 80 TABLET, FILM COATED ORAL at 22:38

## 2019-01-08 RX ADMIN — HEPARIN SODIUM 5000 UNIT(S): 5000 INJECTION INTRAVENOUS; SUBCUTANEOUS at 22:37

## 2019-01-08 RX ADMIN — Medication 2 MILLIGRAM(S): at 22:58

## 2019-01-08 RX ADMIN — PANTOPRAZOLE SODIUM 40 MILLIGRAM(S): 20 TABLET, DELAYED RELEASE ORAL at 06:08

## 2019-01-08 RX ADMIN — Medication 40 MILLIGRAM(S): at 06:08

## 2019-01-08 RX ADMIN — FAMOTIDINE 20 MILLIGRAM(S): 10 INJECTION INTRAVENOUS at 15:17

## 2019-01-08 RX ADMIN — Medication 10 MILLIGRAM(S): at 06:08

## 2019-01-08 NOTE — PROGRESS NOTE ADULT - SUBJECTIVE AND OBJECTIVE BOX
Frances Agee, PGY1  Pager: [phone]  After 7: Night Float pager  Alternate contact:     Patient is a 83y old  Male who presents with a chief complaint of fever, cough (2019 13:28)      SUBJECTIVE / OVERNIGHT EVENTS:    MEDICATIONS  (STANDING):  allopurinol 100 milliGRAM(s) Oral daily  aspirin enteric coated 81 milliGRAM(s) Oral daily  atorvastatin 10 milliGRAM(s) Oral at bedtime  azithromycin  IVPB 500 milliGRAM(s) IV Intermittent every 24 hours  cefTRIAXone   IVPB      cefTRIAXone   IVPB 1 Gram(s) IV Intermittent every 24 hours  colchicine 0.6 milliGRAM(s) Oral daily  dextrose 5%. 1000 milliLiter(s) (50 mL/Hr) IV Continuous <Continuous>  dextrose 50% Injectable 12.5 Gram(s) IV Push once  dextrose 50% Injectable 25 Gram(s) IV Push once  dextrose 50% Injectable 25 Gram(s) IV Push once  doxazosin 2 milliGRAM(s) Oral at bedtime  enalapril 10 milliGRAM(s) Oral daily  famotidine    Tablet 20 milliGRAM(s) Oral daily  folic acid 1 milliGRAM(s) Oral daily  furosemide    Tablet 40 milliGRAM(s) Oral daily  heparin  Injectable 5000 Unit(s) SubCutaneous every 8 hours  insulin lispro (HumaLOG) corrective regimen sliding scale   SubCutaneous three times a day before meals  insulin lispro (HumaLOG) corrective regimen sliding scale   SubCutaneous at bedtime  pantoprazole    Tablet 40 milliGRAM(s) Oral before breakfast    MEDICATIONS  (PRN):  ALBUTerol/ipratropium for Nebulization 3 milliLiter(s) Nebulizer every 6 hours PRN Shortness of Breath and/or Wheezing  dextrose 40% Gel 15 Gram(s) Oral once PRN Blood Glucose LESS THAN 70 milliGRAM(s)/deciliter  glucagon  Injectable 1 milliGRAM(s) IntraMuscular once PRN Glucose LESS THAN 70 milligrams/deciliter      Vital Signs Last 24 Hrs  T(C): 36.9 (2019 06:02), Max: 36.9 (2019 06:02)  T(F): 98.4 (2019 06:02), Max: 98.4 (2019 06:02)  HR: 76 (2019 07:46) (66 - 82)  BP: 137/83 (2019 06:02) (123/67 - 143/84)  BP(mean): --  RR: 18 (2019 06:02) (18 - 18)  SpO2: 97% (2019 07:46) (96% - 100%)  CAPILLARY BLOOD GLUCOSE      POCT Blood Glucose.: 110 mg/dL (2019 07:33)  POCT Blood Glucose.: 134 mg/dL (2019 21:55)    I&O's Summary      PHYSICAL EXAM:  GENERAL: NAD, well-developed  EYES: EOMI, PERRLA, conjunctiva and sclera clear  NECK:  No JVD  CHEST/LUNG: CTABL ; No wheeze  HEART: RRR; No murmurs  ABDOMEN: Soft, Nontender, Nondistended; Bowel sounds present  : Murillo  EXTREMITIES:  2+ Peripheral Pulses, No edema  PSYCH: AAOx3  NEUROLOGY: non-focal  SKIN: No rashes or lesions. No sacral ulcer    LABS:                        12.8   8.87  )-----------( 381      ( 2019 14:00 )             41.7     01-08    139  |  102  |  17  ----------------------------<  108<H>  4.1   |  24  |  1.49<H>    Ca    10.5      2019 05:56  Phos  2.7     -08  Mg     1.7     -08    TPro  6.2  /  Alb  3.2<L>  /  TBili  0.5  /  DBili  x   /  AST  49<H>  /  ALT  48<H>  /  AlkPhos  69  01-07    PT/INR - ( 2019 14:00 )   PT: 12.2 SEC;   INR: 1.07                Urinalysis Basic - ( 2019 17:35 )    Color: YELLOW / Appearance: CLEAR / S.016 / pH: 6.0  Gluc: NEGATIVE / Ketone: NEGATIVE  / Bili: NEGATIVE / Urobili: NORMAL   Blood: NEGATIVE / Protein: 50 / Nitrite: NEGATIVE   Leuk Esterase: NEGATIVE / RBC: 0-2 / WBC 0-2   Sq Epi: OCC / Non Sq Epi: x / Bacteria: NEGATIVE Frances Agee, PGY1  Pager: [phone]  After 7: Night Float pager  Alternate contact:     Patient is a 83y old  Male who presents with a chief complaint of fever, cough (2019 13:28)      SUBJECTIVE / OVERNIGHT EVENTS: No acute events overnight. Patient says he feels a lot better today. Still has a productive cough and is bringing up "cold." Cough is improving. Denies any fever or chills. No chest pain or shortness of breath.     MEDICATIONS  (STANDING):  allopurinol 100 milliGRAM(s) Oral daily  aspirin enteric coated 81 milliGRAM(s) Oral daily  atorvastatin 10 milliGRAM(s) Oral at bedtime  azithromycin  IVPB 500 milliGRAM(s) IV Intermittent every 24 hours  cefTRIAXone   IVPB      cefTRIAXone   IVPB 1 Gram(s) IV Intermittent every 24 hours  colchicine 0.6 milliGRAM(s) Oral daily  dextrose 5%. 1000 milliLiter(s) (50 mL/Hr) IV Continuous <Continuous>  dextrose 50% Injectable 12.5 Gram(s) IV Push once  dextrose 50% Injectable 25 Gram(s) IV Push once  dextrose 50% Injectable 25 Gram(s) IV Push once  doxazosin 2 milliGRAM(s) Oral at bedtime  enalapril 10 milliGRAM(s) Oral daily  famotidine    Tablet 20 milliGRAM(s) Oral daily  folic acid 1 milliGRAM(s) Oral daily  furosemide    Tablet 40 milliGRAM(s) Oral daily  heparin  Injectable 5000 Unit(s) SubCutaneous every 8 hours  insulin lispro (HumaLOG) corrective regimen sliding scale   SubCutaneous three times a day before meals  insulin lispro (HumaLOG) corrective regimen sliding scale   SubCutaneous at bedtime  pantoprazole    Tablet 40 milliGRAM(s) Oral before breakfast    MEDICATIONS  (PRN):  ALBUTerol/ipratropium for Nebulization 3 milliLiter(s) Nebulizer every 6 hours PRN Shortness of Breath and/or Wheezing  dextrose 40% Gel 15 Gram(s) Oral once PRN Blood Glucose LESS THAN 70 milliGRAM(s)/deciliter  glucagon  Injectable 1 milliGRAM(s) IntraMuscular once PRN Glucose LESS THAN 70 milligrams/deciliter      Vital Signs Last 24 Hrs  T(C): 36.9 (2019 06:02), Max: 36.9 (2019 06:02)  T(F): 98.4 (2019 06:02), Max: 98.4 (2019 06:02)  HR: 76 (2019 07:46) (66 - 82)  BP: 137/83 (2019 06:02) (123/67 - 143/84)  BP(mean): --  RR: 18 (2019 06:02) (18 - 18)  SpO2: 97% (2019 07:46) (96% - 100%)  CAPILLARY BLOOD GLUCOSE      POCT Blood Glucose.: 110 mg/dL (2019 07:33)  POCT Blood Glucose.: 134 mg/dL (2019 21:55)    I&O's Summary      PHYSICAL EXAM:  GENERAL: NAD, well-developed  EYES: conjunctiva and sclera clear  NECK:  No JVD  CHEST/LUNG: CTABL ; No wheeze  HEART: RRR; No murmurs  ABDOMEN: Soft, Nontender, +distended; Bowel sounds present  : No Murillo  EXTREMITIES:  2+ Peripheral Pulses, 1+ pitting edema bilaterally  PSYCH: AAOx3  NEUROLOGY: non-focal  SKIN: No rashes or lesions. No sacral ulcer    LABS:                        12.8   8.87  )-----------( 381      ( 2019 14:00 )             41.7     01-08    139  |  102  |  17  ----------------------------<  108<H>  4.1   |  24  |  1.49<H>    Ca    10.5      2019 05:56  Phos  2.7     01-08  Mg     1.7     01-08    TPro  6.2  /  Alb  3.2<L>  /  TBili  0.5  /  DBili  x   /  AST  49<H>  /  ALT  48<H>  /  AlkPhos  69  01-07    PT/INR - ( 2019 14:00 )   PT: 12.2 SEC;   INR: 1.07                Urinalysis Basic - ( 2019 17:35 )    Color: YELLOW / Appearance: CLEAR / S.016 / pH: 6.0  Gluc: NEGATIVE / Ketone: NEGATIVE  / Bili: NEGATIVE / Urobili: NORMAL   Blood: NEGATIVE / Protein: 50 / Nitrite: NEGATIVE   Leuk Esterase: NEGATIVE / RBC: 0-2 / WBC 0-2   Sq Epi: OCC / Non Sq Epi: x / Bacteria: NEGATIVE      < from: CT Chest No Cont (19 @ 10:56) >    INTERPRETATION:  CLINICAL INFORMATION: Cough, shortness of breath, fever    COMPARISON: No prior CT. Comparison is made to radiograph the chest   2019    PROCEDURE:   CT of the Chest was performed without intravenous contrast.  Sagittal and coronal reformats were performed.    FINDINGS:    CHEST:     LUNGS AND LARGE AIRWAYS: Secretions in the trachea. The remainder of the   airways are unremarkable.. Minimal dependent atelectasis. Otherwise, the   lungs are clear.     PLEURA: No pleural effusion. No pneumothorax.    VESSELS: The aorta is normal in course and caliber. There is   atherosclerotic disease of thoracic aorta. There is a replaced right   subclavian artery.    HEART: Heart size is normal. No pericardial effusion. Coronary artery   calcifications involving the left main, circumflex, and right coronary   arteries.    MEDIASTINUM AND CHRISTINE: No lymphadenopathy. The visualized thyroid gland is   unremarkable. Esophagus is unremarkable.    CHEST WALL AND LOWER NECK: Within normal limits.    VISUALIZED UPPER ABDOMEN: There is fatty replacement of the pancreas and   chronic pancreatitis. Centered in the pancreatic bed is a subtle 1.6 x   1.6 cm lesion (series 2 image 112; series 601 image 51). Subcentimeter   exophytic hypodensities of the right kidney too small to characterize.    BONES: Healed left lateral eighth and ninth rib fractures.    IMPRESSION:     1.  No pneumonia.  2.  Centered in the pancreatic head is a subtle 1.6 x 1.6 cm lesion. It   is uncertain if this represents a primary pancreatic neoplasm or   alternatively a nonfatty replaced segment of the gland. MRI abdomen   without and with contrast is recommended for characterization of the   pancreas.                < end of copied text > Frances Agee, PGY1  Pager: [phone]  After 7: Night Float pager  Alternate contact:     Patient is a 83y old  Male who presents with a chief complaint of fever, cough (2019 13:28)      SUBJECTIVE / OVERNIGHT EVENTS: No acute events overnight. Patient says he feels a lot better today. Still has a productive cough and is bringing up "cold." Cough is improving. Denies any fever or chills. No chest pain or shortness of breath.     MEDICATIONS  (STANDING):  allopurinol 100 milliGRAM(s) Oral daily  aspirin enteric coated 81 milliGRAM(s) Oral daily  atorvastatin 10 milliGRAM(s) Oral at bedtime  azithromycin  IVPB 500 milliGRAM(s) IV Intermittent every 24 hours  cefTRIAXone   IVPB      cefTRIAXone   IVPB 1 Gram(s) IV Intermittent every 24 hours  colchicine 0.6 milliGRAM(s) Oral daily  dextrose 5%. 1000 milliLiter(s) (50 mL/Hr) IV Continuous <Continuous>  dextrose 50% Injectable 12.5 Gram(s) IV Push once  dextrose 50% Injectable 25 Gram(s) IV Push once  dextrose 50% Injectable 25 Gram(s) IV Push once  doxazosin 2 milliGRAM(s) Oral at bedtime  enalapril 10 milliGRAM(s) Oral daily  famotidine    Tablet 20 milliGRAM(s) Oral daily  folic acid 1 milliGRAM(s) Oral daily  furosemide    Tablet 40 milliGRAM(s) Oral daily  heparin  Injectable 5000 Unit(s) SubCutaneous every 8 hours  insulin lispro (HumaLOG) corrective regimen sliding scale   SubCutaneous three times a day before meals  insulin lispro (HumaLOG) corrective regimen sliding scale   SubCutaneous at bedtime  pantoprazole    Tablet 40 milliGRAM(s) Oral before breakfast    MEDICATIONS  (PRN):  ALBUTerol/ipratropium for Nebulization 3 milliLiter(s) Nebulizer every 6 hours PRN Shortness of Breath and/or Wheezing  dextrose 40% Gel 15 Gram(s) Oral once PRN Blood Glucose LESS THAN 70 milliGRAM(s)/deciliter  glucagon  Injectable 1 milliGRAM(s) IntraMuscular once PRN Glucose LESS THAN 70 milligrams/deciliter      Vital Signs Last 24 Hrs  T(C): 36.9 (2019 06:02), Max: 36.9 (2019 06:02)  T(F): 98.4 (2019 06:02), Max: 98.4 (2019 06:02)  HR: 76 (2019 07:46) (66 - 82)  BP: 137/83 (2019 06:02) (123/67 - 143/84)  BP(mean): --  RR: 18 (2019 06:02) (18 - 18)  SpO2: 97% (2019 07:46) (96% - 100%)  CAPILLARY BLOOD GLUCOSE      POCT Blood Glucose.: 110 mg/dL (2019 07:33)  POCT Blood Glucose.: 134 mg/dL (2019 21:55)    I&O's Summary      PHYSICAL EXAM:  GENERAL: NAD, well-developed  EYES: conjunctiva and sclera clear  NECK:  No JVD  CHEST/LUNG: CTABL ; No wheeze  HEART: RRR; No murmurs  ABDOMEN: Soft, Nontender, +distended; Bowel sounds present  : No Murillo  EXTREMITIES:  2+ Peripheral Pulses, 1+ pitting edema bilaterally  PSYCH: AAOx3  NEUROLOGY: non-focal  SKIN: No rashes or lesions. No sacral ulcer    LABS:                        12.8   8.87  )-----------( 381      ( 2019 14:00 )             41.7     01-08    139  |  102  |  17  ----------------------------<  108<H>  4.1   |  24  |  1.49<H>      -  Streptococcus sp. (Not Grp A, B or S pneumoniae): + DETECT THI (19 @ 14:22)      Ca    10.5      2019 05:56  Phos  2.7       Mg     1.7     08    TPro  6.2  /  Alb  3.2<L>  /  TBili  0.5  /  DBili  x   /  AST  49<H>  /  ALT  48<H>  /  AlkPhos  69  07    PT/INR - ( 2019 14:00 )   PT: 12.2 SEC;   INR: 1.07                Urinalysis Basic - ( 2019 17:35 )    Color: YELLOW / Appearance: CLEAR / S.016 / pH: 6.0  Gluc: NEGATIVE / Ketone: NEGATIVE  / Bili: NEGATIVE / Urobili: NORMAL   Blood: NEGATIVE / Protein: 50 / Nitrite: NEGATIVE   Leuk Esterase: NEGATIVE / RBC: 0-2 / WBC 0-2   Sq Epi: OCC / Non Sq Epi: x / Bacteria: NEGATIVE      < from: CT Chest No Cont (19 @ 10:56) >    INTERPRETATION:  CLINICAL INFORMATION: Cough, shortness of breath, fever    COMPARISON: No prior CT. Comparison is made to radiograph the chest   2019    PROCEDURE:   CT of the Chest was performed without intravenous contrast.  Sagittal and coronal reformats were performed.    FINDINGS:    CHEST:     LUNGS AND LARGE AIRWAYS: Secretions in the trachea. The remainder of the   airways are unremarkable.. Minimal dependent atelectasis. Otherwise, the   lungs are clear.     PLEURA: No pleural effusion. No pneumothorax.    VESSELS: The aorta is normal in course and caliber. There is   atherosclerotic disease of thoracic aorta. There is a replaced right   subclavian artery.    HEART: Heart size is normal. No pericardial effusion. Coronary artery   calcifications involving the left main, circumflex, and right coronary   arteries.    MEDIASTINUM AND CHRISTINE: No lymphadenopathy. The visualized thyroid gland is   unremarkable. Esophagus is unremarkable.    CHEST WALL AND LOWER NECK: Within normal limits.    VISUALIZED UPPER ABDOMEN: There is fatty replacement of the pancreas and   chronic pancreatitis. Centered in the pancreatic bed is a subtle 1.6 x   1.6 cm lesion (series 2 image 112; series 601 image 51). Subcentimeter   exophytic hypodensities of the right kidney too small to characterize.    BONES: Healed left lateral eighth and ninth rib fractures.    IMPRESSION:     1.  No pneumonia.  2.  Centered in the pancreatic head is a subtle 1.6 x 1.6 cm lesion. It   is uncertain if this represents a primary pancreatic neoplasm or   alternatively a nonfatty replaced segment of the gland. MRI abdomen   without and with contrast is recommended for characterization of the   pancreas.                < end of copied text >

## 2019-01-08 NOTE — PROGRESS NOTE ADULT - PROBLEM SELECTOR PLAN 2
- most likely 2/2 to PNA and obstructive sleep apnea  - continue Abx, duonebs prn as needed  - need to rule out cardiac cause, may have component of HF  - BNP not elevated  - TTE pending - most likely 2/2 to PNA and obstructive sleep apnea  - continue Abx, duonebs prn as needed  - need to rule out cardiac cause, may have component of HF, TTE pending   - BNP not elevated

## 2019-01-08 NOTE — PROGRESS NOTE ADULT - ATTENDING COMMENTS
blood cx grew strep viridans, likely oral source. will f/u echo r/o endocarditis. cont ceftriaxone for now. f/u repeat blood cx to ensure clearance of bacteremia

## 2019-01-08 NOTE — PROGRESS NOTE ADULT - PROBLEM SELECTOR PLAN 5
- most likely 2/2 to sepsis with elevated lactate  - rule out Urine Legionella  - transaminitis improving - incidental finding on CT chest  - pancreatic head lesion concerning for neoplasm vs glandular lesion  - CA 19-9, CEA pending  - if positive can consider MRI

## 2019-01-08 NOTE — PROGRESS NOTE ADULT - PROBLEM SELECTOR PLAN 3
- CKD stage 3 most likely 2/2 to diabetic nephropathy, Cr 1.39 on admission, close to baseline   - continue to trend - Cr 1.39 on admission, baseline unknown, however did have Cr of 1.64 in 03/18  - continue to trend

## 2019-01-08 NOTE — PROGRESS NOTE ADULT - PROBLEM SELECTOR PLAN 1
- patient febrile to 103, , elevated lactate, mets sepsis criteria on admission  - UA negative, RVP neg, pending CT chest, CXR poor study  - empirically treat for CAP with ceftriaxone, and azithro  - pending urine legionella  - one set of Blood cultures growing streptococcus, will repeat blood cultures  - lactate downtrending - patient febrile to 103, , elevated lactate, met sepsis criteria on admission, lactate down downtrending, afebrile, VSS  - UA negative, RVP neg, CXR poor study, CT no pneumonia   - empirically treat for CAP with ceftriaxone, and azithro however given no findings suggestive of PNA on CT, will d/c azithro  - pending urine legionella  - one set of Blood cultures growing streptococcus, will continue ceftriaxone for now, repeat cultures pending

## 2019-01-08 NOTE — PROGRESS NOTE ADULT - PROBLEM SELECTOR PLAN 7
DVT ppx: HSQ  Diet: DASH diet - most likely 2/2 to sepsis with elevated lactate  - rule out Urine Legionella  - transaminitis improving

## 2019-01-08 NOTE — PROGRESS NOTE ADULT - PROBLEM SELECTOR PLAN 6
- Hgb of 12.8 with MCV 63  - Low FE, nl ferritin - Patient obese with obstructive sleep apnea, uses CPAP at home  - continue CPAP at night

## 2019-01-08 NOTE — PROGRESS NOTE ADULT - ASSESSMENT
83 M with PMH of HTN, DM, obstructive sleep apnea on CPAP at night, gout, presenting with fever and productive cough, and shortness of breath meets sepsis criteria with fever, tachycardia, and elevated lactate, with likely source being PNA, pending CT chest, will empirically treat with azithro and ceftriaxone for CAP 83 M with PMH of HTN, DM, obstructive sleep apnea on CPAP at night, gout, presenting with fever and productive cough, and shortness of breath met sepsis criteria with fever, tachycardia, and elevated lactate on admission, found to have one blood culture positive for Strep sp currently on ceftriaxone. Repeat cultures pending.

## 2019-01-08 NOTE — PROGRESS NOTE ADULT - PROBLEM SELECTOR PLAN 4
- Patient obese with obstructive sleeep apnea, uses CPAP at home  - continue CPAP at night - one set positive for strep viridans  - repeat cultures sent  - will continue ceftriaxone  - ECHO pending to r/o endocarditis

## 2019-01-09 LAB
ALBUMIN SERPL ELPH-MCNC: 3.5 G/DL — SIGNIFICANT CHANGE UP (ref 3.3–5)
ALP SERPL-CCNC: 76 U/L — SIGNIFICANT CHANGE UP (ref 40–120)
ALT FLD-CCNC: 40 U/L — SIGNIFICANT CHANGE UP (ref 4–41)
AST SERPL-CCNC: 49 U/L — HIGH (ref 4–40)
BILIRUB SERPL-MCNC: 0.4 MG/DL — SIGNIFICANT CHANGE UP (ref 0.2–1.2)
BUN SERPL-MCNC: 26 MG/DL — HIGH (ref 7–23)
CALCIUM SERPL-MCNC: 10.9 MG/DL — HIGH (ref 8.4–10.5)
CANCER AG19-9 SERPL-ACNC: 61.6 U/ML — HIGH
CHLORIDE SERPL-SCNC: 99 MMOL/L — SIGNIFICANT CHANGE UP (ref 98–107)
CO2 SERPL-SCNC: 25 MMOL/L — SIGNIFICANT CHANGE UP (ref 22–31)
CREAT SERPL-MCNC: 1.85 MG/DL — HIGH (ref 0.5–1.3)
GLUCOSE SERPL-MCNC: 121 MG/DL — HIGH (ref 70–99)
L PNEUMO AG UR QL: NEGATIVE — SIGNIFICANT CHANGE UP
MAGNESIUM SERPL-MCNC: 1.9 MG/DL — SIGNIFICANT CHANGE UP (ref 1.6–2.6)
PHOSPHATE SERPL-MCNC: 3.6 MG/DL — SIGNIFICANT CHANGE UP (ref 2.5–4.5)
POTASSIUM SERPL-MCNC: 3.7 MMOL/L — SIGNIFICANT CHANGE UP (ref 3.5–5.3)
POTASSIUM SERPL-SCNC: 3.7 MMOL/L — SIGNIFICANT CHANGE UP (ref 3.5–5.3)
PROT SERPL-MCNC: 7.1 G/DL — SIGNIFICANT CHANGE UP (ref 6–8.3)
SODIUM SERPL-SCNC: 138 MMOL/L — SIGNIFICANT CHANGE UP (ref 135–145)
SPECIMEN SOURCE: SIGNIFICANT CHANGE UP
SPECIMEN SOURCE: SIGNIFICANT CHANGE UP

## 2019-01-09 PROCEDURE — 93306 TTE W/DOPPLER COMPLETE: CPT | Mod: 26

## 2019-01-09 PROCEDURE — 99223 1ST HOSP IP/OBS HIGH 75: CPT | Mod: GC

## 2019-01-09 PROCEDURE — 99233 SBSQ HOSP IP/OBS HIGH 50: CPT | Mod: GC

## 2019-01-09 RX ADMIN — HEPARIN SODIUM 5000 UNIT(S): 5000 INJECTION INTRAVENOUS; SUBCUTANEOUS at 05:38

## 2019-01-09 RX ADMIN — Medication 1 MILLIGRAM(S): at 13:58

## 2019-01-09 RX ADMIN — Medication 100 MILLIGRAM(S): at 13:58

## 2019-01-09 RX ADMIN — PANTOPRAZOLE SODIUM 40 MILLIGRAM(S): 20 TABLET, DELAYED RELEASE ORAL at 08:43

## 2019-01-09 RX ADMIN — FAMOTIDINE 20 MILLIGRAM(S): 10 INJECTION INTRAVENOUS at 13:58

## 2019-01-09 RX ADMIN — Medication 10 MILLIGRAM(S): at 05:39

## 2019-01-09 RX ADMIN — Medication 1: at 17:24

## 2019-01-09 RX ADMIN — Medication 2 MILLIGRAM(S): at 22:36

## 2019-01-09 RX ADMIN — Medication 81 MILLIGRAM(S): at 13:58

## 2019-01-09 RX ADMIN — Medication 40 MILLIGRAM(S): at 05:39

## 2019-01-09 RX ADMIN — Medication 0.6 MILLIGRAM(S): at 14:02

## 2019-01-09 RX ADMIN — HEPARIN SODIUM 5000 UNIT(S): 5000 INJECTION INTRAVENOUS; SUBCUTANEOUS at 22:36

## 2019-01-09 RX ADMIN — Medication 2: at 08:45

## 2019-01-09 RX ADMIN — ATORVASTATIN CALCIUM 10 MILLIGRAM(S): 80 TABLET, FILM COATED ORAL at 22:36

## 2019-01-09 RX ADMIN — CEFTRIAXONE 100 GRAM(S): 500 INJECTION, POWDER, FOR SOLUTION INTRAMUSCULAR; INTRAVENOUS at 17:24

## 2019-01-09 RX ADMIN — Medication 100 MILLIGRAM(S): at 19:09

## 2019-01-09 RX ADMIN — HEPARIN SODIUM 5000 UNIT(S): 5000 INJECTION INTRAVENOUS; SUBCUTANEOUS at 13:59

## 2019-01-09 NOTE — PROGRESS NOTE ADULT - PROBLEM SELECTOR PLAN 1
- patient febrile to 103, , elevated lactate, met sepsis criteria on admission, lactate down downtrending, afebrile, VSS  - UA negative, RVP neg, CXR poor study, CT no pneumonia   - empirically treat for CAP with ceftriaxone, and azithro however given no findings suggestive of PNA on CT, will d/c azithro  - urine legionella negative   - one set of Blood cultures growing streptococcus, will continue ceftriaxone for now, repeat cultures NGTD - patient febrile to 103, , elevated lactate, met sepsis criteria on admission, lactate down downtrending, afebrile, VSS  - UA negative, RVP neg, CXR poor study, CT no pneumonia   - empirically treat for CAP with ceftriaxone, and azithro however given no findings suggestive of PNA on CT, will d/c azithro  - urine legionella negative   - one set of Blood cultures growing streptococcus, will continue ceftriaxone for now, repeat cultures NGTD  - rule out endocarditis with echo, will d/c with oral vs IV abx depending on echo

## 2019-01-09 NOTE — CONSULT NOTE ADULT - ASSESSMENT
83 M with PMH of HTN, DM, obstructive sleep apnea on CPAP at night, gout, presenting with fever and productive cough, found to have 1/4 cultures positive for Strep Viridans Group. History notable for right tooth extraction 4 weeks ago with ED visits 2 weeks ago for odynophagia and mouth sores, now resolved. Presented with sepsis (high grade fever, tachycardia), CT negative for pneumonia. TTE performed today could not be read.    Although Strep Viridans group could be a contaminant, pt's history of recent dental procedure as well as initial presentation with high fevers and tachycardia are suspicious for a true low grade bacteremia that cleared quickly. Would therefore treat this pt as such.    Recommendations:  -Continue Ceftriaxone, likely will need at least 2 week course for bacteremia, and longer if endocarditis is found.  -since TTE inconclusive, would order POORNIMA for endocarditis rule out. Pt's body habitus makes it difficult to assess heart ascultation.   -trend WBC, fever curve.  -If febrile, reculture.    Jaret Paulino  PGY-1     Will discuss with attending and team. 83 M with HTN, DM, obstructive sleep apnea on CPAP at night, gout, recent dental extraction with oral ulcers and odynophagia about 2-3 weeks ago p/w with fever and  cough  here was febrile to 103, tachycardic and hypotensive  WBC: 8, RVP negative, CXR negative  blood cultures : 1/4 cultures positive for Strep Viridans Group  TTE was limited  repeat cultures negative but on ceftriaxone    sepsis with high fever, tachycardia, hypotension and 1/4 strep viridans bacteremia, although it could be contamination will have to treat in view of recent dental extraction and oral ulcers    * c/w ceftiraxone 2 g q d  * will need POORNIMA to r/o endocarditis  * trend the WBC and renal function  * pt stated that he wants to go home because he is not getting any antibiotics anyway and he feels better, I tried to explain that his antibiotic is once a day and he will need more antibiotics and tests but he said he is going home

## 2019-01-09 NOTE — PROGRESS NOTE ADULT - SUBJECTIVE AND OBJECTIVE BOX
Frances Agee, PGY1  Pager: [phone]  After 7: Night Float pager  Alternate contact:     Patient is a 83y old  Male who presents with a chief complaint of fever, cough (08 Jan 2019 08:36)      SUBJECTIVE / OVERNIGHT EVENTS: No acute events overnight.     MEDICATIONS  (STANDING):  allopurinol 100 milliGRAM(s) Oral daily  aspirin enteric coated 81 milliGRAM(s) Oral daily  atorvastatin 10 milliGRAM(s) Oral at bedtime  cefTRIAXone   IVPB 2 Gram(s) IV Intermittent every 24 hours  colchicine 0.6 milliGRAM(s) Oral daily  dextrose 5%. 1000 milliLiter(s) (50 mL/Hr) IV Continuous <Continuous>  dextrose 50% Injectable 12.5 Gram(s) IV Push once  dextrose 50% Injectable 25 Gram(s) IV Push once  dextrose 50% Injectable 25 Gram(s) IV Push once  doxazosin 2 milliGRAM(s) Oral at bedtime  enalapril 10 milliGRAM(s) Oral daily  famotidine    Tablet 20 milliGRAM(s) Oral daily  folic acid 1 milliGRAM(s) Oral daily  furosemide    Tablet 40 milliGRAM(s) Oral daily  heparin  Injectable 5000 Unit(s) SubCutaneous every 8 hours  insulin lispro (HumaLOG) corrective regimen sliding scale   SubCutaneous three times a day before meals  insulin lispro (HumaLOG) corrective regimen sliding scale   SubCutaneous at bedtime  pantoprazole    Tablet 40 milliGRAM(s) Oral before breakfast    MEDICATIONS  (PRN):  ALBUTerol/ipratropium for Nebulization 3 milliLiter(s) Nebulizer every 6 hours PRN Shortness of Breath and/or Wheezing  dextrose 40% Gel 15 Gram(s) Oral once PRN Blood Glucose LESS THAN 70 milliGRAM(s)/deciliter  glucagon  Injectable 1 milliGRAM(s) IntraMuscular once PRN Glucose LESS THAN 70 milligrams/deciliter      Vital Signs Last 24 Hrs  T(C): 37.1 (09 Jan 2019 05:37), Max: 37.1 (08 Jan 2019 22:56)  T(F): 98.7 (09 Jan 2019 05:37), Max: 98.7 (08 Jan 2019 22:56)  HR: 73 (09 Jan 2019 07:55) (71 - 119)  BP: 124/76 (09 Jan 2019 05:37) (103/51 - 124/76)  BP(mean): --  RR: 20 (09 Jan 2019 05:37) (18 - 20)  SpO2: 94% (09 Jan 2019 07:55) (89% - 96%)  CAPILLARY BLOOD GLUCOSE      POCT Blood Glucose.: 171 mg/dL (08 Jan 2019 22:30)  POCT Blood Glucose.: 148 mg/dL (08 Jan 2019 16:49)  POCT Blood Glucose.: 113 mg/dL (08 Jan 2019 11:57)  POCT Blood Glucose.: 113 mg/dL (08 Jan 2019 11:40)  POCT Blood Glucose.: 218 mg/dL (08 Jan 2019 11:33)    I&O's Summary      PHYSICAL EXAM:  GENERAL: NAD, well-developed  EYES: EOMI, PERRLA, conjunctiva and sclera clear  NECK:  No JVD  CHEST/LUNG: CTABL ; No wheeze  HEART: RRR; No murmurs  ABDOMEN: Soft, Nontender, Nondistended; Bowel sounds present  : Murillo  EXTREMITIES:  2+ Peripheral Pulses, No edema  PSYCH: AAOx3  NEUROLOGY: non-focal  SKIN: No rashes or lesions. No sacral ulcer    LABS:    01-09    138  |  99  |  26<H>  ----------------------------<  121<H>  3.7   |  25  |  1.85<H>    Ca    10.9<H>      09 Jan 2019 07:10  Phos  3.6     01-09  Mg     1.9     01-09    TPro  7.1  /  Alb  3.5  /  TBili  0.4  /  DBili  x   /  AST  49<H>  /  ALT  40  /  AlkPhos  76  01-09 Frances Agee, PGY1  Pager: [phone]  After 7: Night Float pager  Alternate contact:     Patient is a 83y old  Male who presents with a chief complaint of fever, cough (08 Jan 2019 08:36)      SUBJECTIVE / OVERNIGHT EVENTS: No acute events overnight. Patient wants to leave, doesn't want to wait for any more tests. Continues to have productive cough. Denies any chest pain, shortness of breath, nausea, vomiting, or abdominal pain.     MEDICATIONS  (STANDING):  allopurinol 100 milliGRAM(s) Oral daily  aspirin enteric coated 81 milliGRAM(s) Oral daily  atorvastatin 10 milliGRAM(s) Oral at bedtime  cefTRIAXone   IVPB 2 Gram(s) IV Intermittent every 24 hours  colchicine 0.6 milliGRAM(s) Oral daily  dextrose 5%. 1000 milliLiter(s) (50 mL/Hr) IV Continuous <Continuous>  dextrose 50% Injectable 12.5 Gram(s) IV Push once  dextrose 50% Injectable 25 Gram(s) IV Push once  dextrose 50% Injectable 25 Gram(s) IV Push once  doxazosin 2 milliGRAM(s) Oral at bedtime  enalapril 10 milliGRAM(s) Oral daily  famotidine    Tablet 20 milliGRAM(s) Oral daily  folic acid 1 milliGRAM(s) Oral daily  furosemide    Tablet 40 milliGRAM(s) Oral daily  heparin  Injectable 5000 Unit(s) SubCutaneous every 8 hours  insulin lispro (HumaLOG) corrective regimen sliding scale   SubCutaneous three times a day before meals  insulin lispro (HumaLOG) corrective regimen sliding scale   SubCutaneous at bedtime  pantoprazole    Tablet 40 milliGRAM(s) Oral before breakfast    MEDICATIONS  (PRN):  ALBUTerol/ipratropium for Nebulization 3 milliLiter(s) Nebulizer every 6 hours PRN Shortness of Breath and/or Wheezing  dextrose 40% Gel 15 Gram(s) Oral once PRN Blood Glucose LESS THAN 70 milliGRAM(s)/deciliter  glucagon  Injectable 1 milliGRAM(s) IntraMuscular once PRN Glucose LESS THAN 70 milligrams/deciliter      Vital Signs Last 24 Hrs  T(C): 37.1 (09 Jan 2019 05:37), Max: 37.1 (08 Jan 2019 22:56)  T(F): 98.7 (09 Jan 2019 05:37), Max: 98.7 (08 Jan 2019 22:56)  HR: 73 (09 Jan 2019 07:55) (71 - 119)  BP: 124/76 (09 Jan 2019 05:37) (103/51 - 124/76)  BP(mean): --  RR: 20 (09 Jan 2019 05:37) (18 - 20)  SpO2: 94% (09 Jan 2019 07:55) (89% - 96%)  CAPILLARY BLOOD GLUCOSE      POCT Blood Glucose.: 171 mg/dL (08 Jan 2019 22:30)  POCT Blood Glucose.: 148 mg/dL (08 Jan 2019 16:49)  POCT Blood Glucose.: 113 mg/dL (08 Jan 2019 11:57)  POCT Blood Glucose.: 113 mg/dL (08 Jan 2019 11:40)  POCT Blood Glucose.: 218 mg/dL (08 Jan 2019 11:33)    I&O's Summary      PHYSICAL EXAM:  GENERAL: NAD, well-developed  EYES: conjunctiva and sclera clear  NECK:  No JVD  CHEST/LUNG: CTABL ; No wheeze  HEART: RRR; No murmurs  ABDOMEN: Soft, Nontender, + distended; Bowel sounds present  : No Murillo  EXTREMITIES:  2+ Peripheral Pulses, 1+ pitting edema bilaterally   PSYCH: AAOx3  NEUROLOGY: non-focal  SKIN: No rashes or lesions. No sacral ulcer    LABS:    01-09    138  |  99  |  26<H>  ----------------------------<  121<H>  3.7   |  25  |  1.85<H>    Ca    10.9<H>      09 Jan 2019 07:10  Phos  3.6     01-09  Mg     1.9     01-09    TPro  7.1  /  Alb  3.5  /  TBili  0.4  /  DBili  x   /  AST  49<H>  /  ALT  40  /  AlkPhos  76  01-09

## 2019-01-09 NOTE — PROGRESS NOTE ADULT - PROBLEM SELECTOR PLAN 3
- Cr 1.39 on admission, baseline unknown, however did have Cr of 1.64 in 03/18  - continue to trend - Cr 1.39 on admission, baseline unknown, however did have Cr of 1.64 in 03/18  - Cr uptrending, continue to trend  - hold enalapril and lasix

## 2019-01-09 NOTE — PROGRESS NOTE ADULT - PROBLEM SELECTOR PLAN 7
- most likely 2/2 to sepsis with elevated lactate  - Urine legionella negative   - transaminitis improving

## 2019-01-09 NOTE — CONSULT NOTE ADULT - SUBJECTIVE AND OBJECTIVE BOX
HPI:  83 M with PMH of HTN, DM, obstructive sleep apnea on CPAP at night, gout, presenting with fever and productive cough, and shortness of breath for past few days. Patient is a poor historian and history was obtained by son at bedside. Patient has been having poor po intake due to not feeling well. Denies any chest pain, nausea, vomiting, or abdominal pain. He does have a sick contact, his niece recently had flu like symptoms. Patient did have pneumonia in the past. No recent travels.       PAST MEDICAL & SURGICAL HISTORY:  Gout  HLD (hyperlipidemia)  HTN (hypertension)  DM (diabetes mellitus)      Allergies    No Known Allergies    Intolerances        ANTIMICROBIALS:  cefTRIAXone   IVPB 2 every 24 hours      OTHER MEDS:  ALBUTerol/ipratropium for Nebulization 3 milliLiter(s) Nebulizer every 6 hours PRN  allopurinol 100 milliGRAM(s) Oral daily  aspirin enteric coated 81 milliGRAM(s) Oral daily  atorvastatin 10 milliGRAM(s) Oral at bedtime  colchicine 0.6 milliGRAM(s) Oral daily  dextrose 40% Gel 15 Gram(s) Oral once PRN  dextrose 5%. 1000 milliLiter(s) IV Continuous <Continuous>  dextrose 50% Injectable 12.5 Gram(s) IV Push once  dextrose 50% Injectable 25 Gram(s) IV Push once  dextrose 50% Injectable 25 Gram(s) IV Push once  doxazosin 2 milliGRAM(s) Oral at bedtime  famotidine    Tablet 20 milliGRAM(s) Oral daily  folic acid 1 milliGRAM(s) Oral daily  glucagon  Injectable 1 milliGRAM(s) IntraMuscular once PRN  guaiFENesin    Syrup 100 milliGRAM(s) Oral every 6 hours  heparin  Injectable 5000 Unit(s) SubCutaneous every 8 hours  insulin lispro (HumaLOG) corrective regimen sliding scale   SubCutaneous three times a day before meals  insulin lispro (HumaLOG) corrective regimen sliding scale   SubCutaneous at bedtime  pantoprazole    Tablet 40 milliGRAM(s) Oral before breakfast      SOCIAL HISTORY:    Marital Status:    Occupation:   Lives with:     Substance Use (street drugs):   Tobacco Usage:    Alcohol Usage: Social EtOH    FAMILY HISTORY:      ROS:  Unobtainable because:   All other systems negative     Constitutional: no fever, no chills, no weight loss, no night sweats  Eye: no eye pain, no redness, no vision changes  ENT:  no sore throat, no rhinorrhea  Cardiovascular:  no chest pain, no palpitation  Respiratory:  no SOB, no cough  GI:  no abd pain, no vomiting, no diarrhea  urinary: no dysuria, no hematuria, no flank pain  : no  discharge or bleeding  musculoskeletal:  no joint pain, no joint swelling  skin:  no rash  neurology:  no headache, no seizure, no change in mental status  psych: no anxiety, no depression     Physical Exam:    General:    NAD, non toxic  Head: atraumatic, normocephalic  Eyes: normal sclera and conjunctiva  ENT:   no oropharyngeal lesions, no LAD, neck supple  Cardio:    regular S1,S2, no murmur  Respiratory:   clear b/l, no wheezing  abd:   soft, BS +, not tender, no hepatosplenomegaly  :     no CVAT, no suprapubic tenderness, no hoff  Musculoskeletal : no joint swelling, no edema  Skin:    no rash  vascular: no lines, normal pulses  Neurologic:     no focal deficits  psych: normal affect, no suicidal ideation      Drug Dosing Weight  Height (cm): 165.1 (07 Jan 2019 22:04)  Weight (kg): 111.9 (07 Jan 2019 22:04)  BMI (kg/m2): 41.1 (07 Jan 2019 22:04)  BSA (m2): 2.16 (07 Jan 2019 22:04)    Vital Signs Last 24 Hrs  T(F): 98.7 (01-09-19 @ 05:37), Max: 103 (01-06-19 @ 12:38)    Vital Signs Last 24 Hrs  HR: 73 (01-09-19 @ 07:55) (71 - 119)  BP: 124/76 (01-09-19 @ 05:37) (103/51 - 124/76)  RR: 20 (01-09-19 @ 05:37)  SpO2: 94% (01-09-19 @ 07:55) (89% - 96%)  Wt(kg): --        01-09    138  |  99  |  26<H>  ----------------------------<  121<H>  3.7   |  25  |  1.85<H>    Ca    10.9<H>      09 Jan 2019 07:10  Phos  3.6     01-09  Mg     1.9     01-09    TPro  7.1  /  Alb  3.5  /  TBili  0.4  /  DBili  x   /  AST  49<H>  /  ALT  40  /  AlkPhos  76  01-09          MICROBIOLOGY:  v  BLOOD PERIPHERAL  01-08-19 --  --  --      URINE MIDSTREAM  01-06-19 --  --  --      BLOOD PERIPHERAL  01-06-19 --  --  BLOOD CULTURE PCR  Streptococcus Viridans Group      BLOOD  12-22-18 --  --  --      BLOOD VENOUS  12-22-18 --  --  --                  RADIOLOGY:    Images below reviewed personally HPI:  83 M with PMH of HTN, DM, obstructive sleep apnea on CPAP at night, gout, presenting with fever and productive cough, and shortness of breath for past few days. Patient is a poor historian and history was obtained by son at bedside. Patient has been having poor po intake due to not feeling well. Denies any chest pain, nausea, vomiting, or abdominal pain. He does have a sick contact, his niece recently had flu like symptoms. Patient did have pneumonia in the past. No recent travels.    Since admission pt has been treated with ceftriaxone and azithromycin for CAP, CT negative for pneumonia and legionella negative. Azithromycin discontinued. Pt afebrile, VSS.     At bedside this afternoon pt endorsing some cough but denies any other complaints. No fevers, chills, SOB, chest pain, palpitations. Pt states that he wants to go home as as soon as possible since he feels fine.        PAST MEDICAL & SURGICAL HISTORY:  Gout  HLD (hyperlipidemia)  HTN (hypertension)  DM (diabetes mellitus)      Allergies    No Known Allergies    Intolerances        ANTIMICROBIALS:  cefTRIAXone   IVPB 2 every 24 hours      OTHER MEDS:  ALBUTerol/ipratropium for Nebulization 3 milliLiter(s) Nebulizer every 6 hours PRN  allopurinol 100 milliGRAM(s) Oral daily  aspirin enteric coated 81 milliGRAM(s) Oral daily  atorvastatin 10 milliGRAM(s) Oral at bedtime  colchicine 0.6 milliGRAM(s) Oral daily  dextrose 40% Gel 15 Gram(s) Oral once PRN  dextrose 5%. 1000 milliLiter(s) IV Continuous <Continuous>  dextrose 50% Injectable 12.5 Gram(s) IV Push once  dextrose 50% Injectable 25 Gram(s) IV Push once  dextrose 50% Injectable 25 Gram(s) IV Push once  doxazosin 2 milliGRAM(s) Oral at bedtime  famotidine    Tablet 20 milliGRAM(s) Oral daily  folic acid 1 milliGRAM(s) Oral daily  glucagon  Injectable 1 milliGRAM(s) IntraMuscular once PRN  guaiFENesin    Syrup 100 milliGRAM(s) Oral every 6 hours  heparin  Injectable 5000 Unit(s) SubCutaneous every 8 hours  insulin lispro (HumaLOG) corrective regimen sliding scale   SubCutaneous three times a day before meals  insulin lispro (HumaLOG) corrective regimen sliding scale   SubCutaneous at bedtime  pantoprazole    Tablet 40 milliGRAM(s) Oral before breakfast      SOCIAL HISTORY:    Marital Status:   Occupation: Retired  Lives with: Granddaughter and niece in private house    Substance Use (street drugs): Denies   Tobacco Usage:  Denies   Alcohol Usage: Social EtOH    FAMILY HISTORY: None      ROS:  Unobtainable because:   All other systems negative     Constitutional: no fever, no chills, no weight loss, no night sweats  Eye: no eye pain, no redness, no vision changes  ENT:  no sore throat, no rhinorrhea  Cardiovascular:  no chest pain, no palpitation  Respiratory:  +Cough, no SOB  GI:  no abd pain, no vomiting, no diarrhea  urinary: no dysuria, no hematuria, no flank pain  : no  discharge or bleeding  musculoskeletal:  no joint pain, no joint swelling  skin:  no rash  neurology:  no headache, no seizure, no change in mental status  psych: no anxiety, no depression     Physical Exam:    General:    NAD, non toxic  Head: atraumatic, normocephalic  Eyes: normal sclera and conjunctiva  ENT:   no oropharyngeal lesions, no LAD, neck supple  Cardio:    Difficult to assess due to body habitus   Respiratory:   clear b/l, no wheezing  abd:   soft, BS +, not tender, no hepatosplenomegaly. Distended at baseline.   :     no CVAT, no suprapubic tenderness, no hoff  Musculoskeletal : no joint swelling, no edema. No signs of splinter hemorrhages, janeway lesions, osler nodes.   Skin:    no rash  vascular: no lines, normal pulses  Neurologic:     no focal deficits  psych: normal affect, no suicidal ideation      Drug Dosing Weight  Height (cm): 165.1 (07 Jan 2019 22:04)  Weight (kg): 111.9 (07 Jan 2019 22:04)  BMI (kg/m2): 41.1 (07 Jan 2019 22:04)  BSA (m2): 2.16 (07 Jan 2019 22:04)    Vital Signs Last 24 Hrs  T(F): 98.7 (01-09-19 @ 05:37), Max: 103 (01-06-19 @ 12:38)    Vital Signs Last 24 Hrs  HR: 73 (01-09-19 @ 07:55) (71 - 119)  BP: 124/76 (01-09-19 @ 05:37) (103/51 - 124/76)  RR: 20 (01-09-19 @ 05:37)  SpO2: 94% (01-09-19 @ 07:55) (89% - 96%)  Wt(kg): --        01-09    138  |  99  |  26<H>  ----------------------------<  121<H>  3.7   |  25  |  1.85<H>    Ca    10.9<H>      09 Jan 2019 07:10  Phos  3.6     01-09  Mg     1.9     01-09    TPro  7.1  /  Alb  3.5  /  TBili  0.4  /  DBili  x   /  AST  49<H>  /  ALT  40  /  AlkPhos  76  01-09          MICROBIOLOGY:  v  BLOOD PERIPHERAL  01-08-19 --  --  --      URINE MIDSTREAM  01-06-19 --  --  --      BLOOD PERIPHERAL  01-06-19 --  --  BLOOD CULTURE PCR  Streptococcus Viridans Group      BLOOD  12-22-18 --  --  --      BLOOD VENOUS  12-22-18 --  --  --                  RADIOLOGY:  < from: CT Chest No Cont (01.07.19 @ 10:56) >  IMPRESSION:     1.  No pneumonia.  2.  Centered in the pancreatic head is a subtle 1.6 x 1.6 cm lesion. It   is uncertain if this represents a primary pancreatic neoplasm or   alternatively a nonfatty replaced segment of the gland. MRI abdomen   without and with contrast is recommended for characterization of the   pancreas.    < end of copied text >      Images below reviewed personally HPI:  83 M with PMH of HTN, DM, obstructive sleep apnea on CPAP at night, gout, presenting with fever and productive cough, and shortness of breath for past few days. Patient is a poor historian and history was obtained by son at bedside. Patient has been having poor po intake due to not feeling well. Denies any chest pain, nausea, vomiting, or abdominal pain. He does have a sick contact, his niece recently had flu like symptoms. Patient did have pneumonia in the past. No recent travels.    Since admission pt has been treated with ceftriaxone and azithromycin for CAP, CT negative for pneumonia and legionella negative. Azithromycin discontinued. Pt afebrile, VSS.     At bedside this afternoon pt endorsing some cough but denies any other complaints. No fevers, chills, SOB, chest pain, palpitations. Pt states that he wants to go home as as soon as possible since he feels fine.        PAST MEDICAL & SURGICAL HISTORY:  Gout  HLD (hyperlipidemia)  HTN (hypertension)  DM (diabetes mellitus)      Allergies    No Known Allergies    Intolerances        ANTIMICROBIALS:  cefTRIAXone   IVPB 2 every 24 hours      OTHER MEDS:  ALBUTerol/ipratropium for Nebulization 3 milliLiter(s) Nebulizer every 6 hours PRN  allopurinol 100 milliGRAM(s) Oral daily  aspirin enteric coated 81 milliGRAM(s) Oral daily  atorvastatin 10 milliGRAM(s) Oral at bedtime  colchicine 0.6 milliGRAM(s) Oral daily  dextrose 40% Gel 15 Gram(s) Oral once PRN  dextrose 5%. 1000 milliLiter(s) IV Continuous <Continuous>  dextrose 50% Injectable 12.5 Gram(s) IV Push once  dextrose 50% Injectable 25 Gram(s) IV Push once  dextrose 50% Injectable 25 Gram(s) IV Push once  doxazosin 2 milliGRAM(s) Oral at bedtime  famotidine    Tablet 20 milliGRAM(s) Oral daily  folic acid 1 milliGRAM(s) Oral daily  glucagon  Injectable 1 milliGRAM(s) IntraMuscular once PRN  guaiFENesin    Syrup 100 milliGRAM(s) Oral every 6 hours  heparin  Injectable 5000 Unit(s) SubCutaneous every 8 hours  insulin lispro (HumaLOG) corrective regimen sliding scale   SubCutaneous three times a day before meals  insulin lispro (HumaLOG) corrective regimen sliding scale   SubCutaneous at bedtime  pantoprazole    Tablet 40 milliGRAM(s) Oral before breakfast      SOCIAL HISTORY:    Marital Status:   Occupation: Retired  Lives with: Granddaughter and niece in private house    Substance Use (street drugs): Denies   Tobacco Usage:  Denies   Alcohol Usage: Social EtOH    FAMILY HISTORY: pt denied any medical condition in parents      ROS:  All other systems negative     Constitutional: no fever, no chills, no weight loss, no night sweats  Eye: no eye pain, no redness, no vision changes  ENT:  no sore throat, no rhinorrhea  Cardiovascular:  no chest pain, no palpitation  Respiratory:  +Cough, no SOB  GI:  no abd pain, no vomiting, no diarrhea  urinary: no dysuria, no hematuria, no flank pain  : no  discharge or bleeding  musculoskeletal:  no joint pain, no joint swelling  skin:  no rash  neurology:  no headache, no seizure, no change in mental status  psych: no anxiety, no depression     Physical Exam:    General:    NAD, non toxic, obese  Head: atraumatic, normocephalic  Eyes: normal sclera and conjunctiva  ENT:   no oropharyngeal lesions, no LAD, neck supple  Cardio:    Difficult to assess due to body habitus, distant hear sounds  Respiratory:   clear b/l, no wheezing  abd:   soft, BS +, not tender, no hepatosplenomegaly. Distended at baseline.   :     no CVAT, no suprapubic tenderness, no hoff  Musculoskeletal : no joint swelling, no edema. No signs of splinter hemorrhages, janeway lesions, osler nodes.   Skin:    no rash  vascular: no lines, normal pulses  Neurologic:     no focal deficits  psych: normal affect, no suicidal ideation      Drug Dosing Weight  Height (cm): 165.1 (07 Jan 2019 22:04)  Weight (kg): 111.9 (07 Jan 2019 22:04)  BMI (kg/m2): 41.1 (07 Jan 2019 22:04)  BSA (m2): 2.16 (07 Jan 2019 22:04)    Vital Signs Last 24 Hrs  T(F): 98.7 (01-09-19 @ 05:37), Max: 103 (01-06-19 @ 12:38)    Vital Signs Last 24 Hrs  HR: 73 (01-09-19 @ 07:55) (71 - 119)  BP: 124/76 (01-09-19 @ 05:37) (103/51 - 124/76)  RR: 20 (01-09-19 @ 05:37)  SpO2: 94% (01-09-19 @ 07:55) (89% - 96%)  Wt(kg): --        01-09    138  |  99  |  26<H>  ----------------------------<  121<H>  3.7   |  25  |  1.85<H>    Ca    10.9<H>      09 Jan 2019 07:10  Phos  3.6     01-09  Mg     1.9     01-09    TPro  7.1  /  Alb  3.5  /  TBili  0.4  /  DBili  x   /  AST  49<H>  /  ALT  40  /  AlkPhos  76  01-09          MICROBIOLOGY:  v  BLOOD PERIPHERAL  01-08-19 --  --  --      URINE MIDSTREAM  01-06-19 --  --  --      BLOOD PERIPHERAL  01-06-19 --  --  BLOOD CULTURE PCR  Streptococcus Viridans Group      BLOOD  12-22-18 --  --  --      BLOOD VENOUS  12-22-18 --  --  --                  RADIOLOGY:  < from: CT Chest No Cont (01.07.19 @ 10:56) >  IMPRESSION:     1.  No pneumonia.  2.  Centered in the pancreatic head is a subtle 1.6 x 1.6 cm lesion. It   is uncertain if this represents a primary pancreatic neoplasm or   alternatively a nonfatty replaced segment of the gland. MRI abdomen   without and with contrast is recommended for characterization of the   pancreas.    < end of copied text >      Images below reviewed personally

## 2019-01-09 NOTE — PROGRESS NOTE ADULT - PROBLEM SELECTOR PLAN 5
- incidental finding on CT chest  - pancreatic head lesion concerning for neoplasm vs glandular lesion  - CA 19-9 pending, CEA elevated at 10  - if positive can consider MRI - incidental finding on CT chest  - pancreatic head lesion concerning for neoplasm vs glandular lesion  - CA 19-9 is elevated at 61, CEA elevated at 10  - will get MRI for better characterization

## 2019-01-09 NOTE — PROGRESS NOTE ADULT - ASSESSMENT
83 M with PMH of HTN, DM, obstructive sleep apnea on CPAP at night, gout, presenting with fever and productive cough, and shortness of breath met sepsis criteria with fever, tachycardia, and elevated lactate on admission, found to have one blood culture positive for Strep sp currently on ceftriaxone. Repeat cultures pending no growth to date.

## 2019-01-09 NOTE — PROGRESS NOTE ADULT - PROBLEM SELECTOR PLAN 2
- most likely 2/2 to PNA and obstructive sleep apnea  - continue ceftriaxone, azithro d/mee, duonebs prn as needed  - need to rule out cardiac cause, may have component of HF, TTE pending   - BNP not elevated

## 2019-01-09 NOTE — PROGRESS NOTE ADULT - PROBLEM SELECTOR PLAN 4
- one set positive for strep viridans  - repeat cultures NGTD   - will continue ceftriaxone  - ECHO pending to r/o endocarditis

## 2019-01-10 VITALS
DIASTOLIC BLOOD PRESSURE: 80 MMHG | SYSTOLIC BLOOD PRESSURE: 118 MMHG | HEART RATE: 68 BPM | OXYGEN SATURATION: 98 % | TEMPERATURE: 97 F | RESPIRATION RATE: 20 BRPM

## 2019-01-10 PROCEDURE — 99239 HOSP IP/OBS DSCHRG MGMT >30: CPT

## 2019-01-10 PROCEDURE — 74183 MRI ABD W/O CNTR FLWD CNTR: CPT | Mod: 26

## 2019-01-10 PROCEDURE — 99233 SBSQ HOSP IP/OBS HIGH 50: CPT

## 2019-01-10 RX ORDER — AMOXICILLIN 250 MG/5ML
2 SUSPENSION, RECONSTITUTED, ORAL (ML) ORAL
Qty: 56 | Refills: 0 | OUTPATIENT
Start: 2019-01-10 | End: 2019-01-23

## 2019-01-10 RX ADMIN — PANTOPRAZOLE SODIUM 40 MILLIGRAM(S): 20 TABLET, DELAYED RELEASE ORAL at 07:01

## 2019-01-10 RX ADMIN — HEPARIN SODIUM 5000 UNIT(S): 5000 INJECTION INTRAVENOUS; SUBCUTANEOUS at 07:01

## 2019-01-10 RX ADMIN — Medication 100 MILLIGRAM(S): at 01:52

## 2019-01-10 RX ADMIN — CEFTRIAXONE 100 GRAM(S): 500 INJECTION, POWDER, FOR SOLUTION INTRAMUSCULAR; INTRAVENOUS at 14:29

## 2019-01-10 RX ADMIN — Medication 100 MILLIGRAM(S): at 07:01

## 2019-01-10 NOTE — PROGRESS NOTE ADULT - ASSESSMENT
83 M with HTN, DM, obstructive sleep apnea on CPAP at night, gout, recent dental extraction with oral ulcers and odynophagia about 2-3 weeks ago p/w with fever and  cough  here was febrile to 103, tachycardic and hypotensive  WBC: 8, RVP negative, CXR negative  blood cultures : 1/4 cultures positive for Strep Viridans Group  TTE was limited  repeat cultures negative but on ceftriaxone    sepsis with high fever, tachycardia, hypotension and 1/4 strep viridans bacteremia, although it could be contamination but pt had recent dental extraction and oral ulcers  will have to treat as a real bacteremia and r/o endocarditis    * c/w ceftriaxone 2 g q d  * will need POORNIMA to r/o endocarditis  * trend the WBC and renal function  * if blood cultures remain negative for 48 h can place a PICC line

## 2019-01-10 NOTE — PROGRESS NOTE ADULT - PROBLEM SELECTOR PLAN 2
- most likely 2/2 to PNA and obstructive sleep apnea  - continue ceftriaxone, azithro d/mee, duonebs prn as needed  - need to rule out cardiac cause, may have component of HF, TTE pending   - BNP not elevated - Cr 1.39 on admission, baseline unknown, however did have Cr of 1.64 in 03/18  - Cr uptrending, continue to trend  - hold enalapril and lasix

## 2019-01-10 NOTE — PROGRESS NOTE ADULT - PROBLEM SELECTOR PROBLEM 1
Sepsis, unspecified organism

## 2019-01-10 NOTE — PHYSICAL THERAPY INITIAL EVALUATION ADULT - PERTINENT HX OF CURRENT PROBLEM, REHAB EVAL
Pt. admitted for fever, cough. Sepsis. PMH of HTN, DM, obstructive sleep apnea on CPAP at night, gout.

## 2019-01-10 NOTE — PROVIDER CONTACT NOTE (OTHER) - SITUATION
patient removed IV site  refusing to go for POORNIMA   DR that was to do procedure spoke to patient who refused test

## 2019-01-10 NOTE — DISCHARGE NOTE ADULT - OTHER SIGNIFICANT FINDINGS
< from: MR Abdomen w/wo IV Cont (01.10.19 @ 10:49) >    INTERPRETATION:  CLINICAL INFORMATION: Fever, possible pancreatic mass   identified on a recent CT chest. Renal insufficiency.    COMPARISON: CT chest 1/7/2019.    PROCEDURE:   MRI of the abdomen was performed with and without intravenous contrast.  10 ml of Gadavist was administered intravenously without complications   and 0 ml was discarded.    MRCP was performed.    FINDINGS:    LOWER CHEST: Mild cardiomegaly. Mediastinal lipomatosis.    LIVER: Question mild fatty infiltration.  BILE DUCTS: Normal caliber.  GALLBLADDER: Within normal limits.  SPLEEN: Within normal limits.  PANCREAS: Fatty replaced. No focal lesion. The previously questioned   mass-like lesion in the pancreatic head adjacent to the duodenum   demonstrates T2 hyperintensity and T1 hypointensity, similar in   appearance to adjacent bowel.  ADRENALS: Within normal limits.  KIDNEYS/URETERS: Atrophic bilaterally without hydronephrosis. Tiny cysts   in the right kidney. A dominant cyst in the upper pole of left kidney   measuring 9.6 x 4.5 cm.    VISUALIZED PORTIONS:    BOWEL: Within normal limits.   PERITONEUM: No ascites.  VESSELS: Within normal limits.  RETROPERITONEUM: No lymphadenopathy.    ABDOMINAL WALL: Within normal limits.  BONES: Within normal limits.    IMPRESSION: No focal pancreatic lesion. 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        < end of copied text >

## 2019-01-10 NOTE — PROGRESS NOTE ADULT - PROBLEM SELECTOR PLAN 7
- most likely 2/2 to sepsis with elevated lactate  - Urine legionella negative   - transaminitis improving - Hgb of 12.8 with MCV 63  - Low FE, nl ferritin

## 2019-01-10 NOTE — PHYSICAL THERAPY INITIAL EVALUATION ADULT - ADDITIONAL COMMENTS
Social history and previous level of function difficult to obtain from pt. as pt. is difficult to understand. Information obtained from documentation.  Pt. owns DME of straight cane, walker, wheelchair, home O2. Pt. has HHA 7 hours/day for 7 days/week.     Pt. was left seated at edge of bed, NAD. EUGENIA Rodgers made aware of pt. status and participation in PT.

## 2019-01-10 NOTE — DISCHARGE NOTE ADULT - HOSPITAL COURSE
83 M with PMH of HTN, DM, obstructive sleep apnea on CPAP at night, gout, presenting with fever and productive cough, and shortness of breath for past few days. Patient is a poor historian and history was obtained by son at bedside. Patient has been having poor po intake due to not feeling well. Denies any chest pain, nausea, vomiting, or abdominal pain. He does have a sick contact, his niece recently had flu like symptoms. Patient did have pneumonia in the past. No recent travels.     In the ED, patient was found to have fever to 103, tachy to 130 with tachypnea and elevated lactate of 3.7. Patient was given one dose of vanc and zosyn. Patient was empirically started on ceftriaxone and azithro for CAP. A CT chest was done which showed no pneumonia. Blood cultures were sent and 1/4 grew Strep Viridens, unclear if contaminant or real. Azithro was discontinued (urine legionella neg) and patient was continued on ceftriaxone. A TTE was ordered to rule out endocarditis given Strep Viridens and recent dental procedure, which was indeterminant. ID was consulted and recommended a POORNIMA to rule out endocarditis since patient was found to be septic on admission.     Patient's CT scan incidentally found a pancreatic lesion, which could be malignancy. CA 19-9 and CEA were sent and both elevated. MRI of the abdomen was ordered. Patient's baseline kidney function unknown, so elevated Cr could be ELSI or CKD. During hospital course, Cr uptrended and patient's lasix and enalapril were held. 83 M with PMH of HTN, DM, obstructive sleep apnea on CPAP at night, gout, presenting with fever and productive cough, and shortness of breath for past few days. Patient is a poor historian and history was obtained by son at bedside. Patient has been having poor po intake due to not feeling well. Denies any chest pain, nausea, vomiting, or abdominal pain. He does have a sick contact, his niece recently had flu like symptoms. Patient did have pneumonia in the past. No recent travels.     In the ED, patient was found to have fever to 103, tachy to 130 with tachypnea and elevated lactate of 3.7. Patient was given one dose of vanc and zosyn. Patient was empirically started on ceftriaxone and azithro for CAP. A CT chest was done which showed no pneumonia. Blood cultures were sent and 1/4 grew Strep Viridens, unclear if contaminant or real. Azithro was discontinued (urine legionella neg) and patient was continued on ceftriaxone. A TTE was ordered to rule out endocarditis given Strep Viridens and recent dental procedure, which was indeterminant. ID was consulted and recommended a POORNIMA to rule out endocarditis since patient was found to be septic on admission.     Patient's CT scan incidentally found a pancreatic lesion, concerning for possible malignancy. CA 19-9 and CEA were sent and both elevated. MRI of the abdomen was done and showed no pancreatic lesion. 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 Patient's baseline kidney function unknown, so elevated Cr could be ELSI or CKD. During hospital course, Cr uptrended and patient's lasix and enalapril were held. 83 M with PMH of HTN, DM, obstructive sleep apnea on CPAP at night, gout, presenting with fever and productive cough, and shortness of breath for past few days. Patient is a poor historian and history was obtained by son at bedside. Patient has been having poor po intake due to not feeling well. Denies any chest pain, nausea, vomiting, or abdominal pain. He does have a sick contact, his niece recently had flu like symptoms. Patient did have pneumonia in the past. No recent travels.     In the ED, patient was found to have fever to 103, tachy to 130 with tachypnea and elevated lactate of 3.7. Patient was given one dose of vanc and zosyn. Patient was empirically started on ceftriaxone and azithro for CAP. A CT chest was done which showed no pneumonia. Blood cultures were sent and 1/4 grew Strep Viridens, unclear if contaminant or real. Azithro was discontinued (urine legionella neg) and patient was continued on ceftriaxone. A TTE was ordered to rule out endocarditis given Strep Viridens and recent dental procedure, which was indeterminant. ID was consulted and recommended a POORNIMA to rule out endocarditis since patient was found to be septic on admission.     Patient's CT scan incidentally found a pancreatic lesion, concerning for possible malignancy. CA 19-9 and CEA were sent and both elevated. MRI of the abdomen was done and showed no pancreatic lesion. 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 Patient's baseline kidney function unknown, so elevated Cr could be ELSI or CKD. During hospital course, Cr uptrended and patient's lasix and enalapril were held.     Patient initially went for POORNIMA but did not tolerate. Psych evaluated the patient and found that he lacked capacity to make his medical decisions. His son initially was in agreement to pursue the study, but eventually decided to take his father home against medical advice due to his persistent refusal for the test. The risks/benefits/alternatives to staying for full diagnostic workup versus leaving AMA on PO antibiotics were discussed and the son verbalized understanding with confirmation of his decision to leave AMA. 14 days of PO amoxicillin 1g bid were printed for the patient to fill at his pharmacy (not accepting esubmit), and he was advised to follow-up as an outpatient closely and to return if he became sick. 83 M with PMH of HTN, DM, obstructive sleep apnea on CPAP at night, gout, presenting with fever and productive cough, and shortness of breath for past few days. Patient is a poor historian and history was obtained by son at bedside. Patient has been having poor po intake due to not feeling well. Denies any chest pain, nausea, vomiting, or abdominal pain. He does have a sick contact, his niece recently had flu like symptoms. Patient did have pneumonia in the past. No recent travels.     In the ED, patient was found to have fever to 103, tachy to 130 with tachypnea and elevated lactate of 3.7- met criteria for sepsis. Patient was given one dose of vanc and zosyn. Patient was empirically started on ceftriaxone and azithro for CAP. A CT chest was done which showed no pneumonia. Blood cultures were sent and 1/4 grew Strep Viridens.. Azithro was discontinued (urine legionella neg) and patient was continued on ceftriaxone. A TTE was ordered to rule out endocarditis given Strep Viridens and recent dental procedure, which was indeterminant. ID was consulted and recommended a POORNIMA to rule out endocarditis since patient was found to be septic on admission.     Patient's CT scan incidentally found a pancreatic lesion, concerning for possible malignancy. CA 19-9 and CEA were sent and both elevated. MRI of the abdomen was done and showed no pancreatic lesion. 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 Patient's baseline kidney function unknown, so elevated Cr could be ELSI or CKD. During hospital course, Cr uptrended and patient's lasix and enalapril were held.     Patient initially went for POORNMIA but did not tolerate. Psych evaluated the patient and found that he lacked capacity to make his medical decisions. His son initially was in agreement to pursue the study, but eventually decided to take his father home against medical advice due to his persistent refusal for the test. The risks/benefits/alternatives to staying for full diagnostic workup versus leaving AMA on PO antibiotics were discussed and the son verbalized understanding with confirmation of his decision to leave AMA. 14 days of PO amoxicillin 1g bid were printed for the patient to fill at his pharmacy (not accepting esubmit), and he was advised to follow-up as an outpatient closely and to return if he became sick.

## 2019-01-10 NOTE — DISCHARGE NOTE ADULT - PLAN OF CARE
To continue antibiotics and follow up outpatient You came in for fever and productive cough and had a fever of 103 in the hospital. You were started on antibiotics for possible pneumonia, however we did a CT scan of the chest which showed that you do not have pneumonia. Blood cultures show that you have an infection in the blood. Repeat blood cultures show that the infection has cleared, but you must finish your antibiotic course. An ultrasound of the heart was done which showed _______. Please follow up with your primary care doctor after discharge. Resolved You had a CT of the chest which incidentally found a lesion in the pancreas which we were worried could be cancerous. An MRI was done to better look at the pancreas. The MRI showed that you do not have a lesion or mass in the pancreas. Your tumor markers CEA and Ca 19-9 were mildly elevated. You can follow up with gastroenterology outpatient for better monitoring. Continue monitoring Your creatinine which is a marker of kidney function has been worsening while you were in the hospital. We held some of your home medications that can affect the kidney. It is very important that you follow up with your primary care doctor to ensure that your kidney function improves.

## 2019-01-10 NOTE — PROGRESS NOTE ADULT - ASSESSMENT
83 M with PMH of HTN, DM, obstructive sleep apnea on CPAP at night, gout, presenting with fever and productive cough, and shortness of breath met sepsis criteria with fever, tachycardia, and elevated lactate on admission, found to have one blood culture positive for Strep sp currently on ceftriaxone pending POORNIMA. Repeat cultures no growth to date. 83 M with PMH of HTN, DM, obstructive sleep apnea on CPAP at night, gout, presenting with fever and productive cough, and shortness of breath met sepsis criteria with fever, tachycardia, and elevated lactate on admission, found to have one blood culture positive for Strep sp currently on ceftriaxone pending POORNIMA. Repeat cultures no growth to date.     PATIENT HAS NO CAPACITY TO LEAVE AMA

## 2019-01-10 NOTE — PROGRESS NOTE ADULT - PROBLEM SELECTOR PLAN 1
- patient febrile to 103, , elevated lactate, met sepsis criteria on admission, lactate down downtrending, afebrile, VSS  - UA negative, RVP neg, CXR poor study, CT no pneumonia   - empirically treat for CAP with ceftriaxone, and azithro however given no findings suggestive of PNA on CT, will d/c azithro  - urine legionella negative   - one set of Blood cultures growing streptococcus, will continue ceftriaxone for now, repeat cultures NGTD  - rule out endocarditis with echo, will d/c with oral vs IV abx depending on echo

## 2019-01-10 NOTE — PROGRESS NOTE ADULT - PROBLEM SELECTOR PLAN 3
- Cr 1.39 on admission, baseline unknown, however did have Cr of 1.64 in 03/18  - Cr uptrending, continue to trend  - hold enalapril and lasix - one set positive for strep viridans  - repeat cultures NGTD   - will continue ceftriaxone  - TTE to rule out endocarditis indeterminant, pending POORNIMA  - abx therapy based on findings

## 2019-01-10 NOTE — PROGRESS NOTE ADULT - SUBJECTIVE AND OBJECTIVE BOX
Frances Agee, PGY1  Pager: [phone]  After 7: Night Float pager  Alternate contact:     Patient is a 83y old  Male who presents with a chief complaint of fever, cough (10 Bartolo 2019 06:37)      SUBJECTIVE / OVERNIGHT EVENTS: No acute events overnight. Patient NPO for POORNIMA today. Denies any chest pain or shortness of breath. Cough is still present. No other complaints and would like to go home as soon as possible.     MEDICATIONS  (STANDING):  allopurinol 100 milliGRAM(s) Oral daily  aspirin enteric coated 81 milliGRAM(s) Oral daily  atorvastatin 10 milliGRAM(s) Oral at bedtime  cefTRIAXone   IVPB 2 Gram(s) IV Intermittent every 24 hours  colchicine 0.6 milliGRAM(s) Oral daily  dextrose 5%. 1000 milliLiter(s) (50 mL/Hr) IV Continuous <Continuous>  dextrose 50% Injectable 12.5 Gram(s) IV Push once  dextrose 50% Injectable 25 Gram(s) IV Push once  dextrose 50% Injectable 25 Gram(s) IV Push once  doxazosin 2 milliGRAM(s) Oral at bedtime  famotidine    Tablet 20 milliGRAM(s) Oral daily  folic acid 1 milliGRAM(s) Oral daily  guaiFENesin    Syrup 100 milliGRAM(s) Oral every 6 hours  heparin  Injectable 5000 Unit(s) SubCutaneous every 8 hours  insulin lispro (HumaLOG) corrective regimen sliding scale   SubCutaneous three times a day before meals  insulin lispro (HumaLOG) corrective regimen sliding scale   SubCutaneous at bedtime  pantoprazole    Tablet 40 milliGRAM(s) Oral before breakfast    MEDICATIONS  (PRN):  ALBUTerol/ipratropium for Nebulization 3 milliLiter(s) Nebulizer every 6 hours PRN Shortness of Breath and/or Wheezing  dextrose 40% Gel 15 Gram(s) Oral once PRN Blood Glucose LESS THAN 70 milliGRAM(s)/deciliter  glucagon  Injectable 1 milliGRAM(s) IntraMuscular once PRN Glucose LESS THAN 70 milligrams/deciliter      Vital Signs Last 24 Hrs  T(C): 36.3 (10 Bartolo 2019 11:11), Max: 36.6 (09 Jan 2019 22:16)  T(F): 97.3 (10 Bartolo 2019 11:11), Max: 97.9 (09 Jan 2019 22:16)  HR: 68 (10 Bartolo 2019 11:11) (68 - 90)  BP: 118/80 (10 Bartolo 2019 11:11) (116/62 - 119/66)  BP(mean): --  RR: 20 (10 Bartolo 2019 11:11) (19 - 20)  SpO2: 98% (10 Bartolo 2019 11:11) (93% - 98%)  CAPILLARY BLOOD GLUCOSE      POCT Blood Glucose.: 121 mg/dL (10 Bartolo 2019 11:45)  POCT Blood Glucose.: 142 mg/dL (10 Bartolo 2019 07:34)  POCT Blood Glucose.: 131 mg/dL (09 Jan 2019 21:39)  POCT Blood Glucose.: 173 mg/dL (09 Jan 2019 16:53)    I&O's Summary      PHYSICAL EXAM:  GENERAL: NAD, well-developed  EYES: EOMI, PERRLA, conjunctiva and sclera clear  NECK:  No JVD  CHEST/LUNG: CTABL ; No wheeze  HEART: RRR; No murmurs  ABDOMEN: Soft, Nontender, +distended; Bowel sounds present  : No Murillo  EXTREMITIES:  2+ Peripheral Pulses, 1+ edema bilaterally   PSYCH: AAOx3  NEUROLOGY: non-focal  SKIN: No rashes or lesions. No sacral ulcer    LABS:    01-09    138  |  99  |  26<H>  ----------------------------<  121<H>  3.7   |  25  |  1.85<H>    Ca    10.9<H>      09 Jan 2019 07:10  Phos  3.6     01-09  Mg     1.9     01-09    TPro  7.1  /  Alb  3.5  /  TBili  0.4  /  DBili  x   /  AST  49<H>  /  ALT  40  /  AlkPhos  76  01-09    < from: MR Abdomen w/wo IV Cont (01.10.19 @ 10:49) >  INTERPRETATION:  CLINICAL INFORMATION: Fever, possible pancreatic mass   identified on a recent CT chest. Renal insufficiency.    COMPARISON: CT chest 1/7/2019.    PROCEDURE:   MRI of the abdomen was performed with and without intravenous contrast.  10 ml of Gadavist was administered intravenously without complications   and 0 ml was discarded.    MRCP was performed.    FINDINGS:    LOWER CHEST: Mild cardiomegaly. Mediastinal lipomatosis.    LIVER: Question mild fatty infiltration.  BILE DUCTS: Normal caliber.  GALLBLADDER: Within normal limits.  SPLEEN: Within normal limits.  PANCREAS: Fatty replaced. No focal lesion. The previously questioned   mass-like lesion in the pancreatic head adjacent to the duodenum   demonstrates T2 hyperintensity and T1 hypointensity, similar in   appearance to adjacent bowel.  ADRENALS: Within normal limits.  KIDNEYS/URETERS: Atrophic bilaterally without hydronephrosis. Tiny cysts   in the right kidney. A dominant cyst in the upper pole of left kidney   measuring 9.6 x 4.5 cm.    VISUALIZED PORTIONS:    BOWEL: Within normal limits.   PERITONEUM: No ascites.  VESSELS: Within normal limits.  RETROPERITONEUM: No lymphadenopathy.    ABDOMINAL WALL: Within normal limits.  BONES: Within normal limits.    IMPRESSION: No focal pancreatic lesion. 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      < end of copied text >

## 2019-01-10 NOTE — PROGRESS NOTE ADULT - PROBLEM SELECTOR PLAN 6
- Patient obese with obstructive sleep apnea, uses CPAP at home  - continue CPAP at night - most likely 2/2 to sepsis with elevated lactate  - Urine legionella negative   - transaminitis improving

## 2019-01-10 NOTE — DISCHARGE NOTE ADULT - CARE PLAN
Principal Discharge DX:	Bacteremia  Goal:	To continue antibiotics and follow up outpatient  Assessment and plan of treatment:	You came in for fever and productive cough and had a fever of 103 in the hospital. You were started on antibiotics for possible pneumonia, however we did a CT scan of the chest which showed that you do not have pneumonia. Blood cultures show that you have an infection in the blood. Repeat blood cultures show that the infection has cleared, but you must finish your antibiotic course. An ultrasound of the heart was done which showed _______. Please follow up with your primary care doctor after discharge.  Secondary Diagnosis:	Pancreatic lesion  Goal:	Resolved  Assessment and plan of treatment:	You had a CT of the chest which incidentally found a lesion in the pancreas which we were worried could be cancerous. An MRI was done to better look at the pancreas. The MRI showed that you do not have a lesion or mass in the pancreas. Your tumor markers CEA and Ca 19-9 were mildly elevated. You can follow up with gastroenterology outpatient for better monitoring.  Secondary Diagnosis:	ELSI (acute kidney injury)  Goal:	Continue monitoring  Assessment and plan of treatment:	Your creatinine which is a marker of kidney function has been worsening while you were in the hospital. We held some of your home medications that can affect the kidney. It is very important that you follow up with your primary care doctor to ensure that your kidney function improves.

## 2019-01-10 NOTE — PROGRESS NOTE ADULT - PROBLEM SELECTOR PLAN 4
- one set positive for strep viridans  - repeat cultures NGTD   - will continue ceftriaxone  - ECHO pending to r/o endocarditis - one set positive for strep viridans  - repeat cultures NGTD   - will continue ceftriaxone  - TTE to rule out endocarditis indeterminant, pending POORNIMA  - abx therapy based on findings - incidental finding on CT chest  - pancreatic head lesion concerning for neoplasm vs glandular lesion  - CA 19-9 is elevated at 61, CEA elevated at 10  - MRI shows no pancreatic lesion. 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

## 2019-01-10 NOTE — PHYSICAL THERAPY INITIAL EVALUATION ADULT - GENERAL OBSERVATIONS, REHAB EVAL
Consult received, chart reviewed. Patient received seated at edge of bed, NAD. Patient agreed to Evaluation from Physical Therapist.

## 2019-01-10 NOTE — PROGRESS NOTE ADULT - SUBJECTIVE AND OBJECTIVE BOX
Follow Up:  sepsis bacteremia    Interval History: pt stable and afebrile, yesterday during the consult pt stated that he wants to leave, but he did not, had an abd MRI for the possible pancreatic lesion which showed no pancreatic mass, ?duodenal diverticulum     ROS:      All other systems negative    Constitutional: no fever, no chills  Head: no trauma  Eyes: no vision changes, no eye pain  ENT:  no sore throat, no rhinorrhea  Cardiovascular:  no chest pain, no palpitation  Respiratory:  no SOB, cough  GI:  no abd pain, no vomiting, no diarrhea  urinary: no dysuria, no hematuria, no flank pain  musculoskeletal:  no joint pain, no joint swelling  skin:  no rash  neurology:  no headache, no seizure, no change in mental status  psych: no anxiety, no depression         Allergies  No Known Allergies        ANTIMICROBIALS:  cefTRIAXone   IVPB 2 every 24 hours      OTHER MEDS:  ALBUTerol/ipratropium for Nebulization 3 milliLiter(s) Nebulizer every 6 hours PRN  allopurinol 100 milliGRAM(s) Oral daily  aspirin enteric coated 81 milliGRAM(s) Oral daily  atorvastatin 10 milliGRAM(s) Oral at bedtime  colchicine 0.6 milliGRAM(s) Oral daily  dextrose 40% Gel 15 Gram(s) Oral once PRN  dextrose 5%. 1000 milliLiter(s) IV Continuous <Continuous>  dextrose 50% Injectable 12.5 Gram(s) IV Push once  dextrose 50% Injectable 25 Gram(s) IV Push once  dextrose 50% Injectable 25 Gram(s) IV Push once  doxazosin 2 milliGRAM(s) Oral at bedtime  famotidine    Tablet 20 milliGRAM(s) Oral daily  folic acid 1 milliGRAM(s) Oral daily  glucagon  Injectable 1 milliGRAM(s) IntraMuscular once PRN  guaiFENesin    Syrup 100 milliGRAM(s) Oral every 6 hours  heparin  Injectable 5000 Unit(s) SubCutaneous every 8 hours  insulin lispro (HumaLOG) corrective regimen sliding scale   SubCutaneous three times a day before meals  insulin lispro (HumaLOG) corrective regimen sliding scale   SubCutaneous at bedtime  pantoprazole    Tablet 40 milliGRAM(s) Oral before breakfast      Vital Signs Last 24 Hrs  T(C): 36.3 (10 Bartolo 2019 11:11), Max: 36.6 (09 Jan 2019 22:16)  T(F): 97.3 (10 Bartolo 2019 11:11), Max: 97.9 (09 Jan 2019 22:16)  HR: 68 (10 Bartolo 2019 11:11) (68 - 90)  BP: 118/80 (10 Bartolo 2019 11:11) (116/62 - 119/66)  BP(mean): --  RR: 20 (10 Bartolo 2019 11:11) (19 - 20)  SpO2: 98% (10 Bartolo 2019 11:11) (93% - 98%)    Physical Exam:  General:    NAD, non toxic, obese  Head: atraumatic, normocephalic  Eyes: normal sclera and conjunctiva  ENT:   no oropharyngeal lesions, no LAD, neck supple  Cardio:    Difficult to assess due to body habitus, distant hear sounds  Respiratory:   clear b/l, no wheezing  abd:   soft, BS +, not tender, no hepatosplenomegaly. Distended at baseline.   :     no CVAT, no suprapubic tenderness, no hoff  Musculoskeletal : no joint swelling, no edema  Skin:    no rash  vascular: no lines, normal pulses  Neurologic:     no focal deficits  psych: normal affect, no suicidal ideation          01-09    138  |  99  |  26<H>  ----------------------------<  121<H>  3.7   |  25  |  1.85<H>    Ca    10.9<H>      09 Jan 2019 07:10  Phos  3.6     01-09  Mg     1.9     01-09    TPro  7.1  /  Alb  3.5  /  TBili  0.4  /  DBili  x   /  AST  49<H>  /  ALT  40  /  AlkPhos  76  01-09          MICROBIOLOGY:  v  BLOOD PERIPHERAL  01-08-19 --  --  --      URINE MIDSTREAM  01-06-19 --  --  --      BLOOD PERIPHERAL  01-06-19 --  --  BLOOD CULTURE PCR  Streptococcus Viridans Group      BLOOD  12-22-18 --  --  --      BLOOD VENOUS  12-22-18 --  --  --                RADIOLOGY:  Images below reviewed personally  < from: MR Abdomen w/wo IV Cont (01.10.19 @ 10:49) >  IMPRESSION: No focal pancreatic lesion. 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    < from: CT Chest No Cont (01.07.19 @ 10:56) >    IMPRESSION:     1.  No pneumonia.  2.  Centered in the pancreatic head is a subtle 1.6 x 1.6 cm lesion. It   is uncertain if this represents a primary pancreatic neoplasm or   alternatively a nonfatty replaced segment of the gland. MRI abdomen   without and with contrast is recommended for characterization of the   pancreas.

## 2019-01-10 NOTE — DISCHARGE NOTE ADULT - PATIENT PORTAL LINK FT
You can access the [facility] Patient Portal, offered by [facility], by registering with the following website: [email or web address]

## 2019-01-10 NOTE — PROGRESS NOTE ADULT - PROBLEM SELECTOR PLAN 5
- incidental finding on CT chest  - pancreatic head lesion concerning for neoplasm vs glandular lesion  - CA 19-9 is elevated at 61, CEA elevated at 10  - will get MRI for better characterization - incidental finding on CT chest  - pancreatic head lesion concerning for neoplasm vs glandular lesion  - CA 19-9 is elevated at 61, CEA elevated at 10  - MRI shows no pancreatic lesion. 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 - Patient obese with obstructive sleep apnea, uses CPAP at home  - continue CPAP at night

## 2019-01-10 NOTE — PHYSICAL THERAPY INITIAL EVALUATION ADULT - CRITERIA FOR SKILLED THERAPEUTIC INTERVENTIONS
anticipated discharge recommendation/predicted duration of therapy intervention/impairments found/therapy frequency/rehab potential

## 2019-01-10 NOTE — PROGRESS NOTE ADULT - PROBLEM SELECTOR PLAN 8
- Hgb of 12.8 with MCV 63  - Low FE, nl ferritin DVT ppx: HSQ  Diet: DASH diet DVT ppx: HSQ  Diet: DASH diet     Patient has no capacity to leave AMA

## 2019-01-11 LAB — BACTERIA BLD CULT: SIGNIFICANT CHANGE UP

## 2019-01-13 LAB
BACTERIA BLD CULT: SIGNIFICANT CHANGE UP
BACTERIA BLD CULT: SIGNIFICANT CHANGE UP
